# Patient Record
Sex: MALE | Race: BLACK OR AFRICAN AMERICAN | NOT HISPANIC OR LATINO | Employment: OTHER | URBAN - METROPOLITAN AREA
[De-identification: names, ages, dates, MRNs, and addresses within clinical notes are randomized per-mention and may not be internally consistent; named-entity substitution may affect disease eponyms.]

---

## 2017-07-13 ENCOUNTER — TELEPHONE (OUTPATIENT)
Dept: NEUROLOGY | Facility: CLINIC | Age: 37
End: 2017-07-13

## 2017-07-13 NOTE — TELEPHONE ENCOUNTER
Spoke to pt and scheduled new appt with Dr Davila 7/27 at 2pm/ pt required this particular day due to transportation. Notes in Epic.

## 2017-07-27 ENCOUNTER — OFFICE VISIT (OUTPATIENT)
Dept: NEUROLOGY | Facility: CLINIC | Age: 37
End: 2017-07-27
Payer: MEDICAID

## 2017-07-27 VITALS
SYSTOLIC BLOOD PRESSURE: 108 MMHG | HEIGHT: 72 IN | HEART RATE: 66 BPM | BODY MASS INDEX: 37.81 KG/M2 | DIASTOLIC BLOOD PRESSURE: 67 MMHG | WEIGHT: 279.13 LBS

## 2017-07-27 DIAGNOSIS — G40.209 COMPLEX PARTIAL SEIZURES EVOLVING TO GENERALIZED TONIC-CLONIC SEIZURES: Primary | ICD-10-CM

## 2017-07-27 PROCEDURE — 99205 OFFICE O/P NEW HI 60 MIN: CPT | Mod: S$PBB,,, | Performed by: PSYCHIATRY & NEUROLOGY

## 2017-07-27 PROCEDURE — 99999 PR PBB SHADOW E&M-EST. PATIENT-LVL III: CPT | Mod: PBBFAC,,, | Performed by: PSYCHIATRY & NEUROLOGY

## 2017-07-27 PROCEDURE — 99213 OFFICE O/P EST LOW 20 MIN: CPT | Mod: PBBFAC | Performed by: PSYCHIATRY & NEUROLOGY

## 2017-07-27 NOTE — LETTER
July 30, 2017      Justice Garcia MD  [address]  [address]  [facility]  [place] [id number]           [facility] [place] - Neurology Epilepsy  [address]  [facility] [address]  Phone: [phone]  Fax: [phone]          Patient: Young Joyner   MR Number: [id number]   YOB: 1980   Date of Visit: 7/27/2017       Dear Dr. Justice Garcia:    Thank you for referring Young Joyner to me for evaluation. Attached you will find relevant portions of my assessment and plan of care.    If you have questions, please do not hesitate to call me. I look forward to following Young Joyner along with you.    Sincerely,        Enclosure  CC:  No Recipients    If you would like to receive this communication electronically, please contact [email or web address] or [phone] to request more information on [employer] access.    For providers and/or their staff who would like to refer a patient to [facility], please contact us through our one-stop-shop provider referral line, [facility], at [phone].    If you feel you have received this communication in error or would no longer like to receive these types of communications, please e-mail [email or web address]
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
## 2017-07-31 PROBLEM — G40.209 COMPLEX PARTIAL SEIZURES EVOLVING TO GENERALIZED TONIC-CLONIC SEIZURES: Status: ACTIVE | Noted: 2017-07-31

## 2017-07-31 NOTE — ASSESSMENT & PLAN NOTE
38yo RH M with hx of seizures x 2002: poorly controlled  Risk factors: paternal family hx    Current AEDs:  - Levetiracetam 750mg bid  - PHT 400mg bid    Plan:  - will admit to EMU: for seizure characterization as well as to assess burden  - will d/c PHT at the time and optimize LEV +/- add another agent  - MRI Brain   - Seizure precautions/restrictions    Plan of care was discussed with patient and his wife in detail

## 2017-08-23 ENCOUNTER — TELEPHONE (OUTPATIENT)
Dept: NEUROLOGY | Facility: CLINIC | Age: 37
End: 2017-08-23

## 2017-08-23 NOTE — TELEPHONE ENCOUNTER
----- Message from Oz Benson sent at 8/23/2017  1:54 PM CDT -----  Contact: Patient @ [phone]  Patient is requesting a return call from Gabby about his arrival time on 8-25, [facility] call

## 2017-08-23 NOTE — TELEPHONE ENCOUNTER
Spoke to pt and advised EMU admission 8/25 is approved. Pt can check in btwn 11-12. Pt verbalized understanding and confirmed appt.

## 2017-08-25 ENCOUNTER — HOSPITAL ENCOUNTER (INPATIENT)
Facility: HOSPITAL | Age: 37
LOS: 2 days | Discharge: HOME | DRG: 101 | End: 2017-08-27
Attending: PSYCHIATRY & NEUROLOGY | Admitting: PSYCHIATRY & NEUROLOGY
Payer: MEDICAID

## 2017-08-25 DIAGNOSIS — G40.219 INTRACTABLE COMPLEX PARTIAL EPILEPSY: ICD-10-CM

## 2017-08-25 DIAGNOSIS — G40.209 COMPLEX PARTIAL SEIZURES EVOLVING TO GENERALIZED TONIC-CLONIC SEIZURES: ICD-10-CM

## 2017-08-25 LAB
ALBUMIN SERPL BCP-MCNC: 3.4 G/DL
ALP SERPL-CCNC: 101 U/L
ALT SERPL W/O P-5'-P-CCNC: 33 U/L
AMPHET+METHAMPHET UR QL: NEGATIVE
ANION GAP SERPL CALC-SCNC: 9 MMOL/L
AST SERPL-CCNC: 29 U/L
BARBITURATES UR QL SCN>200 NG/ML: NEGATIVE
BASOPHILS # BLD AUTO: 0.04 K/UL
BASOPHILS NFR BLD: 0.5 %
BENZODIAZ UR QL SCN>200 NG/ML: NEGATIVE
BILIRUB SERPL-MCNC: 0.2 MG/DL
BILIRUB UR QL STRIP: NEGATIVE
BUN SERPL-MCNC: 9 MG/DL
BZE UR QL SCN: NEGATIVE
CALCIUM SERPL-MCNC: 8.5 MG/DL
CANNABINOIDS UR QL SCN: NORMAL
CHLORIDE SERPL-SCNC: 107 MMOL/L
CLARITY UR REFRACT.AUTO: CLEAR
CO2 SERPL-SCNC: 25 MMOL/L
COLOR UR AUTO: YELLOW
CREAT SERPL-MCNC: 0.9 MG/DL
CREAT UR-MCNC: 262 MG/DL
DIFFERENTIAL METHOD: ABNORMAL
EOSINOPHIL # BLD AUTO: 0.1 K/UL
EOSINOPHIL NFR BLD: 1.6 %
ERYTHROCYTE [DISTWIDTH] IN BLOOD BY AUTOMATED COUNT: 14.4 %
EST. GFR  (AFRICAN AMERICAN): >60 ML/MIN/1.73 M^2
EST. GFR  (NON AFRICAN AMERICAN): >60 ML/MIN/1.73 M^2
GLUCOSE SERPL-MCNC: 91 MG/DL
GLUCOSE UR QL STRIP: NEGATIVE
HCT VFR BLD AUTO: 38.9 %
HGB BLD-MCNC: 13.4 G/DL
HGB UR QL STRIP: NEGATIVE
KETONES UR QL STRIP: NEGATIVE
LEUKOCYTE ESTERASE UR QL STRIP: NEGATIVE
LYMPHOCYTES # BLD AUTO: 4.1 K/UL
LYMPHOCYTES NFR BLD: 51.1 %
MCH RBC QN AUTO: 30.2 PG
MCHC RBC AUTO-ENTMCNC: 34.4 G/DL
MCV RBC AUTO: 88 FL
METHADONE UR QL SCN>300 NG/ML: NEGATIVE
MONOCYTES # BLD AUTO: 0.4 K/UL
MONOCYTES NFR BLD: 5.2 %
NEUTROPHILS # BLD AUTO: 3.4 K/UL
NEUTROPHILS NFR BLD: 41.5 %
NITRITE UR QL STRIP: NEGATIVE
OPIATES UR QL SCN: NEGATIVE
PCP UR QL SCN>25 NG/ML: NEGATIVE
PH UR STRIP: 5 [PH] (ref 5–8)
PLATELET # BLD AUTO: 209 K/UL
PMV BLD AUTO: 10 FL
POTASSIUM SERPL-SCNC: 3.6 MMOL/L
PROT SERPL-MCNC: 7.2 G/DL
PROT UR QL STRIP: NEGATIVE
RBC # BLD AUTO: 4.43 M/UL
SODIUM SERPL-SCNC: 141 MMOL/L
SP GR UR STRIP: 1.02 (ref 1–1.03)
TOXICOLOGY INFORMATION: NORMAL
URN SPEC COLLECT METH UR: NORMAL
UROBILINOGEN UR STRIP-ACNC: NEGATIVE EU/DL
WBC # BLD AUTO: 8.09 K/UL

## 2017-08-25 PROCEDURE — 80186 ASSAY OF PHENYTOIN FREE: CPT

## 2017-08-25 PROCEDURE — 36415 COLL VENOUS BLD VENIPUNCTURE: CPT

## 2017-08-25 PROCEDURE — 95951 PR EEG MONITORING/VIDEORECORD: CPT | Mod: 26,,, | Performed by: PSYCHIATRY & NEUROLOGY

## 2017-08-25 PROCEDURE — 20600001 HC STEP DOWN PRIVATE ROOM

## 2017-08-25 PROCEDURE — 80307 DRUG TEST PRSMV CHEM ANLYZR: CPT

## 2017-08-25 PROCEDURE — 99223 1ST HOSP IP/OBS HIGH 75: CPT | Mod: ,,, | Performed by: PSYCHIATRY & NEUROLOGY

## 2017-08-25 PROCEDURE — 95957 EEG DIGITAL ANALYSIS: CPT

## 2017-08-25 PROCEDURE — 81003 URINALYSIS AUTO W/O SCOPE: CPT

## 2017-08-25 PROCEDURE — 85025 COMPLETE CBC W/AUTO DIFF WBC: CPT

## 2017-08-25 PROCEDURE — 80177 DRUG SCRN QUAN LEVETIRACETAM: CPT

## 2017-08-25 PROCEDURE — 95951 HC EEG MONITORING/VIDEO RECORD: CPT

## 2017-08-25 PROCEDURE — 80053 COMPREHEN METABOLIC PANEL: CPT

## 2017-08-25 RX ORDER — TRAMADOL HYDROCHLORIDE 50 MG/1
100 TABLET ORAL ONCE
Status: COMPLETED | OUTPATIENT
Start: 2017-08-26 | End: 2017-08-26

## 2017-08-25 RX ORDER — ONDANSETRON 8 MG/1
8 TABLET, ORALLY DISINTEGRATING ORAL EVERY 8 HOURS PRN
Status: DISCONTINUED | OUTPATIENT
Start: 2017-08-25 | End: 2017-08-27 | Stop reason: HOSPADM

## 2017-08-25 RX ORDER — SODIUM CHLORIDE 0.9 % (FLUSH) 0.9 %
3 SYRINGE (ML) INJECTION EVERY 8 HOURS
Status: DISCONTINUED | OUTPATIENT
Start: 2017-08-25 | End: 2017-08-27 | Stop reason: HOSPADM

## 2017-08-25 RX ORDER — DIPHENHYDRAMINE HCL 50 MG
50 CAPSULE ORAL ONCE
Status: COMPLETED | OUTPATIENT
Start: 2017-08-26 | End: 2017-08-26

## 2017-08-25 RX ORDER — ACETAMINOPHEN 325 MG/1
650 TABLET ORAL EVERY 4 HOURS PRN
Status: DISCONTINUED | OUTPATIENT
Start: 2017-08-25 | End: 2017-08-27 | Stop reason: HOSPADM

## 2017-08-25 RX ORDER — DOCUSATE SODIUM 100 MG/1
100 CAPSULE, LIQUID FILLED ORAL 2 TIMES DAILY
Status: DISCONTINUED | OUTPATIENT
Start: 2017-08-25 | End: 2017-08-27 | Stop reason: HOSPADM
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
## 2017-08-25 NOTE — SUBJECTIVE & OBJECTIVE
No past medical history on file.    No past surgical history on file.    Review of patient's allergies indicates:  No Known Allergies    Current Neurological Medications: Keppra 750 mg BID and Dilantin 400 mg BID.     No current facility-administered medications on file prior to encounter.      No current outpatient prescriptions on file prior to encounter.     Family History     None        Social History Main Topics    Smoking status: Current Every Day Smoker    Smokeless tobacco: Not on file    Alcohol use Not on file    Drug use: Unknown    Sexual activity: Not on file     Review of Systems   Constitutional: Negative for activity change, appetite change, chills, diaphoresis, fatigue and fever.   HENT: Negative.    Eyes: Negative.    Respiratory: Negative.    Cardiovascular: Negative.    Gastrointestinal: Negative.    Endocrine: Negative.    Skin: Negative.    Neurological: Positive for seizures.   Psychiatric/Behavioral: Positive for confusion. The patient is nervous/anxious.      Objective:     Vital Signs (Most Recent):    Vital Signs (24h Range):  BP: ()/()   Arterial Line BP: ()/()         There is no height or weight on file to calculate BMI.    Physical Exam   Constitutional: He is oriented to person, place, and time. He appears well-developed and well-nourished.   HENT:   Head: Normocephalic and atraumatic.   Eyes: Conjunctivae and EOM are normal. Pupils are equal, round, and reactive to light.   Neck: Normal range of motion.   Cardiovascular: Normal heart sounds.    Pulmonary/Chest: Effort normal.   Abdominal: Soft.   Musculoskeletal: Normal range of motion.   Neurological: He is alert and oriented to person, place, and time. He has normal strength and normal reflexes. He has a normal Finger-Nose-Finger Test. Gait normal.   Reflex Scores:       Tricep reflexes are 2+ on the right side and 2+ on the left side.       Bicep reflexes are 2+ on the right side and 2+ on the left side.        Brachioradialis reflexes are 2+ on the right side and 2+ on the left side.       Patellar reflexes are 2+ on the right side and 2+ on the left side.       Achilles reflexes are 2+ on the right side and 2+ on the left side.  Psychiatric: His speech is normal.       NEUROLOGICAL EXAMINATION:     MENTAL STATUS   Oriented to person, place, and time.   Attention: normal. Concentration: normal.   Speech: speech is normal   Level of consciousness: alert  Knowledge: good.   Normal comprehension. Praxis: normal     CRANIAL NERVES   Cranial nerves II through XII intact.     CN III, IV, VI   Pupils are equal, round, and reactive to light.  Extraocular motions are normal.     MOTOR EXAM     Strength   Strength 5/5 throughout.     REFLEXES     Reflexes   Right brachioradialis: 2+  Left brachioradialis: 2+  Right biceps: 2+  Left biceps: 2+  Right triceps: 2+  Left triceps: 2+  Right patellar: 2+  Left patellar: 2+  Right achilles: 2+  Left achilles: 2+  Right : 2+  Left : 2+    SENSORY EXAM   Light touch normal.     GAIT AND COORDINATION     Gait  Gait: normal     Coordination   Finger to nose coordination: normal    Tremor   Resting tremor: absent      Significant Labs: All pertinent lab results from the past 24 hours have been reviewed.    Significant Imaging: I have reviewed all pertinent imaging results/findings within the past 24 hours.

## 2017-08-25 NOTE — ASSESSMENT & PLAN NOTE
Mr Joyner is a 37 year old AA man with a PMHx of sleep apnea and complex partial seizures who presents for EMU admission due to increased frequency of his seizure episodes. He will be admitted to EMU for seizure characterization and AED management.    -Continuous vEEG  -Hold all home AEDs. Pt reports that he takes Keppra 750 mg PO BID and Dilantin 400 mg PO BID.  -Seizure precautions per protocol  -Activating procedures: photic stimulation and hyperventilation twice daily; morning and afternoon.  -Sleep deprivation: Keep pt awake till 2 AM, and then wake pt up again at 6 AM.  -Tramadol 100 mg PO and Benadryl 50 mg PO once tomorrow AM.   -IV Valium PRN for GTC lasting greater than 5 minutes-please call EMU resident on call prior to administering the medication.  -Pt has been discussed with Dr. Kenney.

## 2017-08-26 PROBLEM — G40.219 INTRACTABLE COMPLEX PARTIAL EPILEPSY: Status: ACTIVE | Noted: 2017-08-26

## 2017-08-26 PROCEDURE — 94640 AIRWAY INHALATION TREATMENT: CPT

## 2017-08-26 PROCEDURE — 25000003 PHARM REV CODE 250: Performed by: STUDENT IN AN ORGANIZED HEALTH CARE EDUCATION/TRAINING PROGRAM

## 2017-08-26 PROCEDURE — 25000003 PHARM REV CODE 250: Performed by: PSYCHIATRY & NEUROLOGY

## 2017-08-26 PROCEDURE — 63600175 PHARM REV CODE 636 W HCPCS

## 2017-08-26 PROCEDURE — 63600175 PHARM REV CODE 636 W HCPCS: Performed by: PSYCHIATRY & NEUROLOGY

## 2017-08-26 PROCEDURE — 95813 EEG EXTND MNTR 61-119 MIN: CPT | Mod: 26,,, | Performed by: PSYCHIATRY & NEUROLOGY

## 2017-08-26 PROCEDURE — 95957 EEG DIGITAL ANALYSIS: CPT

## 2017-08-26 PROCEDURE — 25000242 PHARM REV CODE 250 ALT 637 W/ HCPCS: Performed by: NURSE PRACTITIONER

## 2017-08-26 PROCEDURE — A4216 STERILE WATER/SALINE, 10 ML: HCPCS | Performed by: STUDENT IN AN ORGANIZED HEALTH CARE EDUCATION/TRAINING PROGRAM

## 2017-08-26 PROCEDURE — 99233 SBSQ HOSP IP/OBS HIGH 50: CPT | Mod: ,,, | Performed by: PSYCHIATRY & NEUROLOGY

## 2017-08-26 PROCEDURE — 63600175 PHARM REV CODE 636 W HCPCS: Performed by: NURSE PRACTITIONER

## 2017-08-26 PROCEDURE — 20600001 HC STEP DOWN PRIVATE ROOM

## 2017-08-26 PROCEDURE — 95951 HC EEG MONITORING/VIDEO RECORD: CPT

## 2017-08-26 RX ORDER — LORAZEPAM 2 MG/ML
INJECTION INTRAMUSCULAR
Status: DISPENSED
Start: 2017-08-26 | End: 2017-08-27

## 2017-08-26 RX ORDER — LORAZEPAM 2 MG/ML
1 CONCENTRATE ORAL EVERY 8 HOURS PRN
Status: DISCONTINUED | OUTPATIENT
Start: 2017-08-26 | End: 2017-08-26

## 2017-08-26 RX ORDER — LORAZEPAM 1 MG/1
1 TABLET ORAL 3 TIMES DAILY
Status: DISCONTINUED | OUTPATIENT
Start: 2017-08-26 | End: 2017-08-27 | Stop reason: HOSPADM

## 2017-08-26 RX ORDER — LORAZEPAM 2 MG/ML
1 INJECTION INTRAMUSCULAR ONCE
Status: COMPLETED | OUTPATIENT
Start: 2017-08-26 | End: 2017-08-26

## 2017-08-26 RX ORDER — IPRATROPIUM BROMIDE AND ALBUTEROL SULFATE 2.5; .5 MG/3ML; MG/3ML
3 SOLUTION RESPIRATORY (INHALATION) ONCE
Status: COMPLETED | OUTPATIENT
Start: 2017-08-26 | End: 2017-08-26

## 2017-08-26 RX ORDER — ONDANSETRON 2 MG/ML
8 INJECTION INTRAMUSCULAR; INTRAVENOUS ONCE
Status: COMPLETED | OUTPATIENT
Start: 2017-08-26 | End: 2017-08-26

## 2017-08-26 RX ORDER — LAMOTRIGINE 25 MG/1
25 TABLET ORAL DAILY
Status: DISCONTINUED | OUTPATIENT
Start: 2017-08-26 | End: 2017-08-27 | Stop reason: HOSPADM

## 2017-08-26 RX ADMIN — IPRATROPIUM BROMIDE AND ALBUTEROL SULFATE 3 ML: .5; 3 SOLUTION RESPIRATORY (INHALATION) at 03:08

## 2017-08-26 RX ADMIN — ONDANSETRON 8 MG: 2 INJECTION INTRAMUSCULAR; INTRAVENOUS at 01:08

## 2017-08-26 RX ADMIN — ONDANSETRON 8 MG: 8 TABLET, ORALLY DISINTEGRATING ORAL at 01:08

## 2017-08-26 RX ADMIN — DIPHENHYDRAMINE HYDROCHLORIDE 50 MG: 50 CAPSULE ORAL at 06:08

## 2017-08-26 RX ADMIN — LORAZEPAM 1 MG: 2 INJECTION, SOLUTION INTRAMUSCULAR; INTRAVENOUS at 02:08

## 2017-08-26 RX ADMIN — DOCUSATE SODIUM 100 MG: 100 CAPSULE, LIQUID FILLED ORAL at 09:08

## 2017-08-26 RX ADMIN — TRAMADOL HYDROCHLORIDE 100 MG: 50 TABLET, COATED ORAL at 06:08

## 2017-08-26 RX ADMIN — LEVETIRACETAM 750 MG: 250 TABLET, FILM COATED ORAL at 09:08

## 2017-08-26 RX ADMIN — Medication 3 ML: at 10:08

## 2017-08-26 RX ADMIN — LORAZEPAM 1 MG: 1 TABLET ORAL at 09:08

## 2017-08-26 NOTE — PROGRESS NOTES
Nurse called to room at 0050 d/t pt having seizure. Upon arrival, pt with observed tonic clonic seizure with tongue biting lasting about 3 minutes. Pt desat to 88 percent. O2 applied. Pt presents with post ictal confusion, N/V, and impulsiveness lasting about 60 minutes. Pt observed to be thrashing around in the bed and constantly trying to get up. Pt throwing up bright red bloody sputum. One time dose of IV zofran ordered and given. Pt finally calm and at baseline at 0200. VSS. Will continue to monitor pt.

## 2017-08-26 NOTE — PROGRESS NOTES
Pt having tonic clonic seizure that lasted approximately 3 minutes, followed by a one hour long post ictal phase. During the post ictal phase, he became combative, became nauseated, and attempted to get OOB several times while being confused and unable to state name or orientation otherwise. After approximately 30 minutes into the post ictal phase, he stated his name and stated that he did not recall having a seizure. Side rails up and padded. O2 sat remained 98% on 6L NC O2 throughout seizure and post ictal phase. Wife remains at bedside.
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
## 2017-08-26 NOTE — PROGRESS NOTES
Paged stroke team in regards to pt with O2 sat of 88 percent and continuously throwing up bright red bloody sputum. Orders for a chest xray placed. Will continue to monitor.

## 2017-08-26 NOTE — PLAN OF CARE
Problem: Patient Care Overview  Goal: Plan of Care Review  Outcome: Ongoing (interventions implemented as appropriate)  POC reviewed with the patient. Verbalized understanding. Fall precautions and safety maintained. No falls/injury this shift. No new skin impairments noted. AAOx4. Afebrile. VSS. Cardiac monitoring continued. Sleep deprived until 2 am. Seizure precautions maintained. Seizure activity overnight, see previous note. Pt progressing toward goals. Will continue to monitor.

## 2017-08-26 NOTE — PROGRESS NOTES
Pt had tonic clonic seizure that lasted approximately 3 minutes, followed by a 45-50 minute post ictal phase. Pt bit his tongue and became very combative during the post ictal phase. He climbed OOB and onto the floor despite the best efforts of three staff members. Pt could not be redirected or follow commands. He was flailing his arms and legs. Ativan 1 mg IVP was given, to which patient had no response. Dr. Davila was paged and she ordered an additional dose of Ativan 1 mg IVP. This was given with moderate to good results noted. Pt returned to bed and is presently sleeping. His O2 Sat = 98, and remained 98% throughout the seizure and post ictal phase and the administration of the Ativan.

## 2017-08-27 ENCOUNTER — NURSE TRIAGE (OUTPATIENT)
Dept: ADMINISTRATIVE | Facility: CLINIC | Age: 37
End: 2017-08-27

## 2017-08-27 VITALS
RESPIRATION RATE: 16 BRPM | TEMPERATURE: 98 F | WEIGHT: 289 LBS | DIASTOLIC BLOOD PRESSURE: 63 MMHG | SYSTOLIC BLOOD PRESSURE: 118 MMHG | HEART RATE: 81 BPM | BODY MASS INDEX: 37.09 KG/M2 | OXYGEN SATURATION: 98 % | HEIGHT: 74 IN

## 2017-08-27 PROCEDURE — 99233 SBSQ HOSP IP/OBS HIGH 50: CPT | Mod: ,,, | Performed by: PSYCHIATRY & NEUROLOGY

## 2017-08-27 PROCEDURE — 25000003 PHARM REV CODE 250: Performed by: PSYCHIATRY & NEUROLOGY

## 2017-08-27 PROCEDURE — 25000003 PHARM REV CODE 250: Performed by: STUDENT IN AN ORGANIZED HEALTH CARE EDUCATION/TRAINING PROGRAM

## 2017-08-27 RX ORDER — LORAZEPAM 1 MG/1
TABLET ORAL
Qty: 18 TABLET | Refills: 0 | Status: SHIPPED | OUTPATIENT
Start: 2017-08-27 | End: 2018-07-26

## 2017-08-27 RX ORDER — LAMOTRIGINE 25 MG/1
TABLET ORAL
Qty: 120 TABLET | Refills: 3 | Status: SHIPPED | OUTPATIENT
Start: 2017-08-27 | End: 2018-01-09 | Stop reason: SDUPTHER

## 2017-08-27 RX ORDER — LEVETIRACETAM 1000 MG/1
1000 TABLET ORAL 2 TIMES DAILY
Qty: 180 TABLET | Refills: 1 | Status: SHIPPED | OUTPATIENT
Start: 2017-08-27 | End: 2018-07-26 | Stop reason: SDUPTHER

## 2017-08-27 RX ADMIN — LEVETIRACETAM 750 MG: 250 TABLET, FILM COATED ORAL at 10:08

## 2017-08-27 RX ADMIN — LORAZEPAM 1 MG: 1 TABLET ORAL at 06:08

## 2017-08-27 RX ADMIN — ACETAMINOPHEN 650 MG: 325 TABLET ORAL at 04:08

## 2017-08-27 RX ADMIN — LAMOTRIGINE 25 MG: 25 TABLET ORAL at 10:08
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
## 2017-08-27 NOTE — PROCEDURES
EMU Monitoring  Date/Time: 8/26/2017 8:24 PM  Performed by: GELY MORRIS  Authorized by: CRISTAL LING          EPILEPSY MONITORING UNIT  EEG/VIDEO TELEMETRY REPORT  DATE OF SERVICE: 8/25/17-8/27/17  EEG NUMBER: -1, 2, 3, 4 (File 4 unreadable as patient removed electrodes)  REQUESTED BY:  Lola  LOCATION OF SERVICE: [facility]    METHODOLOGY      Electroencephalographic (EEG) is recorded with electrodes placed according to the International 10-20 placement system.  Thirty Two (32) channels of digital signal including the T1 and T2 electrodes are simultaneously recorded from the scalp and also including EKG, EMG  and/or eye movement monitors.  Recording band pass was 0.1 to 512 hz.  Digital video recording of the patient is simultaneously recorded with the EEG.  The patient is instructed report clinical symptoms which may occur during the recording session.  EEG and video recording is stored and archived in digital format. Activation procedures which include photic stimulation, hyperventilation and instructing patients to perform simple task are done in selected patients.         The EEG is displayed on a monitor screen and can be reformatted into different montages for evaluation.  The entire recoding is submitted for computer assisted analysis to detect spike and electrographic seizure activity.  The entire recording is visually reviewed and the times identified by computer analysis as being spikes or seizures are reviewed again.  Compresses spectral analysis (CSA) is also performed on the activity recorded from each individual channel.  This is displayed as a power display of frequencies from 0 to 30 Hz over time.   The CSA analysis is done and displayed continuously.  This is reviewed for asymmetries in power between homologous areas of the scalp and for presence of changes in power which canbe seen when seizures occur.  Sections of suspected abnormalities on the CSA is then compared with the original EEG  recording.      [employer] software was also utilized in the review of this study.  This software suite analyzes the EEG recording in multiple domains.  Coherence and rhythmicity is computed to identify EEG sections which may contain organized seizures.  Each channel undergoes analysis to detect presence of spike and sharp waves which have special and morphological characteristic of epileptic activity.  The routine EEG recording is converted from spacial into frequency domain.  This is then displayed comparing homologous areas to identify areas of significant asymmetry.  Algorithm to identify non-cortically generated artifact is used to separate eye movement, EMG and other artifact from the EEG.      Recording Times  Start on 8/25/17 at 15:25:04  Stop on  8/26/17 at 18:14:51 (patient ripped electrodes off while postictally confused)  A total of 27 hours of EEG/Video telemetry was recorded.    ELECTROENCEPHALOGRAM  INTERICTAL: The record shows a good  organization at rest, consisting of a 20-50 uV 9 Hz posterior dominant rhythm with good  reactivity. There is mild bilateral beta activity.    Drowsiness is characterized by attenuation of the background, vertex waves, and bilateral theta slowing. Stage II sleep is characterized by slowing, vertex waves, and symmetric sleep spindles. Slow wave and REM sleep are recorded.    EKG recording shows a sinus rhythm.    ICTAL:  Seizures Recorded  Seizure 1 - On 8/26/17 at 00:49:00 there  Is onset of rhythmic 4-5 Hz activity over the left temporal region maximal at T1 followed by high amplitude, sharply contoured 3-4 Hz rhythmic activity with development of bilateral rhythmic 3-4 Hz spike and wave activity at 00:49:40 before the record is largely obscured by myogenicartifact at 00:49:57 until end of the seizure at 00:51:23  Seizure 2 - On 8/26/17 at 13:51:20 there is onset of rhythmic 4-5 Hz activity over the left temporal region maximal at T1 that rapidly becomes sharply contoured  and increases in amplitude before spreading throughout the left hemisphere and then bilaterally at 13:52:17. The record is obscured by myogenic artifact at 13:52:19 until end of the seizure at 13:53:46.  Seizure 3 - On 8/26/17 at 18:11:13 there is onset of rhythmic, sharply contoured 3-4 Hz activity over the left frontotemporal region maximal at T1 as well as F7, T3, and T5. At 18:11:27, this evolves into 1.5-2 Hz spike and slow wave discharges with spread throughout the left hemisphere followed by diffuse background slowing at 18:11:49.     CLINICAL DESCRIPTION OF EVENTS/SEIZURES:  Seizure 1: At 00:49:00, the patient is lying in bed when he exhibits orofacial automatisms at 00:49:09 followed by head turn to the right at 00:49:49 with figure 4 posturing at 00:50:16 with the left arm flexed and right arm extended. Tonic clonic jerking begins at 00:50:29 until the end of the seizure.  Seizure 2- At 13:51:20 on 8/26/17b, the patient is lying in bed on his left side and exhibits orofacial automatisms at 13:51:31 with a head turn and eye deviation to the right at 13:52:14. He exhibits continued chewing automatisms before vocalizing an ictal cry. His left arm tonically postures followed shortly thereafter by the right. At 13:42:29, he hold both arms flexed upward before exhibiting R>L sided tonic clonic jerking ending at 13:53:46.  Seizure 3-  At 18:11:13 on 8/26/17, the patient is lying in bed. He appears disoriented and sits up at 18:11:59 before pulling off his bedsheets and flailing his arms. He begins to pull at wires in his bed at 18:12:32 before getting out of bed at 18:14:28 and ripping off his electrodes. The study is unreadable after 18:14:51 with scattered left hemispheric sharp wave discharges until the end of the recording     FINAL SUMMARY  ELECTROENCEPHALOGRAM: This study is abnormal and diagnostic for a left temporal focal onset seizure disorder. Three typical patient seizures, all with left temporal onset  are captured during the course of the recording.    CLINICAL SEIZURE/EVENT   Classification: Focal onset   Localization: Temporal   Lateralization: Left    Oz Kenney MD  Department of Neurology  [facility]

## 2017-08-27 NOTE — DISCHARGE SUMMARY
[facility]  Discharge Summary      Admit Date: 8/25/2017    Discharge Date and Time:  08/27/2017 9:14 AM    Attending Physician: Oz Kenney MD     Reason for Admission: Seizure eval    Procedures Performed: * No surgery found *    Hospital Course: Patient admitted to EMU for event characterization. All home AEDs held. Multiple typical seizures captured during admission with L hemispheric onset. Home dilantin discontinued. Patient discharged on increased dose of keppra (1000 mg BID up from 750 mg BID) with initiation of Lamictal and ativan taper. Patient will follow up within 1 month with Dr. Davila.     Consults: none    Significant Diagnostic Studies: cEEG: 3 electroclinical seizures captured with L hemispheric onset    Final Diagnoses:    Principal Problem: Complex partial seizures evolving to generalized tonic-clonic seizures    Discharged Condition: good    Disposition: Home or Self Care    Follow Up/Patient Instructions:     Medications:  Reconciled Home Medications:   Current Discharge Medication List      START taking these medications    Details   lamotrigine (LAMICTAL) 25 MG tablet 1 tab daily for 2 weeks, then 2 tabs daily for 2 weeks, then 4 tabs daily for 2 weeks and continue  Qty: 120 tablet, Refills: 3      levetiracetam (KEPPRA) 1000 MG tablet Take 1 tablet (1,000 mg total) by mouth 2 (two) times daily.  Qty: 180 tablet, Refills: 1      lorazepam (ATIVAN) 1 MG tablet 1 tab 3 times daily for 3 days, then 1 tab 2 times daily for 3 days, then 1 tab daily for 3 days then stop  Qty: 18 tablet, Refills: 0             Discharge Procedure Orders  Diet general     Activity as tolerated     Call MD for:  increased confusion or weakness     Call MD for:  persistent dizziness, light-headedness, or visual disturbances       Follow-up Information     Noemi Davila MD In 1 month.    Specialty:  Neurology  Contact information:  [address] [place] [address] [id number] [phone]                  Oz Kenney MD  Department of Neurology  [facility]

## 2017-08-27 NOTE — TELEPHONE ENCOUNTER
Family had questions about patient's discharge medications that I was unable to answer. She wanted to know if patient received his lamicital this morning already. Advised to attempt to reach out to floor where he was discharged.    Reason for Disposition   Information only question and nurse able to answer    Protocols used: ST NO PROTOCOL CALL: INFORMATION ONLY-A-[place]

## 2017-08-28 LAB
PHENYTOIN FREE SERPL-MCNC: 1.1 MCG/ML
PHENYTOIN, TOTAL: 11.2 MCG/ML

## 2017-09-06 LAB — LEVETIRACETAM SERPL-MCNC: <1 UG/ML (ref 3–60)

## 2017-09-08 ENCOUNTER — TELEPHONE (OUTPATIENT)
Dept: NEUROLOGY | Facility: CLINIC | Age: 37
End: 2017-09-08

## 2017-09-27 ENCOUNTER — TELEPHONE (OUTPATIENT)
Dept: NEUROLOGY | Facility: CLINIC | Age: 37
End: 2017-09-27

## 2017-09-27 NOTE — TELEPHONE ENCOUNTER
Called patient and he informed me that the fax number he gave me yesterday was incorrect. A new # was given an back to work form was re faxed off today.

## 2017-09-27 NOTE — TELEPHONE ENCOUNTER
"----- Message from Laron Myers sent at 9/27/2017  9:12 AM CDT -----  Contact: Self @ [phone]   Pt said he gave wrong fax number in last message about faxing a "return to work" letter to his work. Correct fax is .  "

## 2017-09-28 ENCOUNTER — OFFICE VISIT (OUTPATIENT)
Dept: NEUROLOGY | Facility: CLINIC | Age: 37
End: 2017-09-28
Payer: MEDICAID

## 2017-09-28 VITALS
SYSTOLIC BLOOD PRESSURE: 116 MMHG | WEIGHT: 273.56 LBS | HEIGHT: 74 IN | BODY MASS INDEX: 35.11 KG/M2 | HEART RATE: 67 BPM | DIASTOLIC BLOOD PRESSURE: 71 MMHG

## 2017-09-28 DIAGNOSIS — G40.209 COMPLEX PARTIAL SEIZURES EVOLVING TO GENERALIZED TONIC-CLONIC SEIZURES: Primary | ICD-10-CM

## 2017-09-28 DIAGNOSIS — G40.219 PARTIAL SYMPTOMATIC EPILEPSY WITH COMPLEX PARTIAL SEIZURES, INTRACTABLE, WITHOUT STATUS EPILEPTICUS: Primary | ICD-10-CM

## 2017-09-28 DIAGNOSIS — Z51.81 ENCOUNTER FOR MEDICATION TITRATION: ICD-10-CM

## 2017-09-28 PROCEDURE — 99212 OFFICE O/P EST SF 10 MIN: CPT | Mod: PBBFAC | Performed by: PSYCHIATRY & NEUROLOGY

## 2017-09-28 PROCEDURE — 99215 OFFICE O/P EST HI 40 MIN: CPT | Mod: S$PBB,,, | Performed by: PSYCHIATRY & NEUROLOGY

## 2017-09-28 PROCEDURE — 99999 PR PBB SHADOW E&M-EST. PATIENT-LVL II: CPT | Mod: PBBFAC,,, | Performed by: PSYCHIATRY & NEUROLOGY

## 2017-09-28 PROCEDURE — 3008F BODY MASS INDEX DOCD: CPT | Mod: ,,, | Performed by: PSYCHIATRY & NEUROLOGY

## 2017-09-28 RX ORDER — PHENYTOIN SODIUM 100 MG/1
CAPSULE, EXTENDED RELEASE ORAL
Refills: 5 | COMMUNITY
Start: 2017-07-24 | End: 2018-07-26

## 2017-09-28 RX ORDER — HYDROCODONE BITARTRATE AND ACETAMINOPHEN 10; 325 MG/1; MG/1
1 TABLET ORAL 3 TIMES DAILY PRN
Refills: 0 | COMMUNITY
Start: 2017-09-21 | End: 2018-07-26

## 2017-09-28 RX ORDER — ALPRAZOLAM 0.5 MG/1
0.5 TABLET ORAL DAILY PRN
Refills: 0 | COMMUNITY
Start: 2017-09-21 | End: 2018-07-26

## 2017-09-28 RX ORDER — LAMOTRIGINE 100 MG/1
100 TABLET ORAL 2 TIMES DAILY
Qty: 60 TABLET | Refills: 3 | Status: SHIPPED | OUTPATIENT
Start: 2017-09-28 | End: 2018-07-26 | Stop reason: SDUPTHER

## 2017-09-28 RX ORDER — ORPHENADRINE CITRATE 100 MG/1
100 TABLET, EXTENDED RELEASE ORAL NIGHTLY PRN
Refills: 0 | COMMUNITY
Start: 2017-08-21 | End: 2018-07-26

## 2017-09-28 RX ORDER — CLONAZEPAM 1 MG/1
TABLET ORAL
Refills: 2 | COMMUNITY
Start: 2017-08-30 | End: 2018-07-26

## 2017-09-28 RX ORDER — CLONAZEPAM 0.5 MG/1
0.5 TABLET ORAL 2 TIMES DAILY
Refills: 1 | Status: ON HOLD | COMMUNITY
Start: 2017-07-31 | End: 2023-02-13 | Stop reason: HOSPADM

## 2017-09-28 RX ORDER — FLUVOXAMINE MALEATE 100 MG/1
100 TABLET, COATED ORAL DAILY
Refills: 1 | COMMUNITY
Start: 2017-07-24 | End: 2018-07-26

## 2017-09-28 NOTE — LETTER
September 28, 2017      Talon Narvaez MD  [address] [address]           [facility] - Neurology Epilepsy  [address] [facility], 7th [address]  [facility] [address]  Phone: [phone]  Fax: [phone]          Patient: Young Joyner   MR Number: [id number]   YOB: 1980   Date of Visit: 9/28/2017       Dear Dr. Talon Narvaez:    Thank you for referring Young Joyner to me for evaluation. Attached you will find relevant portions of my assessment and plan of care.    If you have questions, please do not hesitate to call me. I look forward to following Young Joyner along with you.    Sincerely,    Noemi Davila MD    Enclosure  CC:  No Recipients    If you would like to receive this communication electronically, please contact [email or web address] or [phone] to request more information on [employer] access.    For providers and/or their staff who would like to refer a patient to [facility], please contact us through our one-stop-shop provider referral line, [facility] [place], at [phone].    If you feel you have received this communication in error or would no longer like to receive these types of communications, please e-mail [email or web address]

## 2017-09-28 NOTE — ASSESSMENT & PLAN NOTE
36yo M with hx of seizures x 2002: staring spells and aura followed by GTC seizures.  EMU evaluation (8/17) multiple typical seizures of left hemispheric onset captured   - no seizures reported since EMU admission, but occasional episodes suggestive of aura persists    Currently on AEDs: Levetiracetam 1000mg bid and Lamotrigine 100mg bid    Plan:  - increase LTG by 50mg weekly to 150mg bid (written instructions given) and check levels 2 weeks after - can adjust based on symptoms and side-effects if any  - Continue LEV at same doses; check levels     Discussed plan of care in detail with patient and his wife.
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
## 2017-12-21 RX ORDER — LAMOTRIGINE 25 MG/1
TABLET ORAL
Qty: 120 TABLET | Refills: 3 | OUTPATIENT
Start: 2017-12-21

## 2018-01-02 ENCOUNTER — TELEPHONE (OUTPATIENT)
Dept: NEUROLOGY | Facility: CLINIC | Age: 38
End: 2018-01-02

## 2018-01-02 NOTE — TELEPHONE ENCOUNTER
"Received call from pt stating he is "doing well for the most part" since EMU discharge in 8/27/17 however pt sleeps with cpap machine and has recently been waking up without cpap, bitten tongue and extremely fatigued afterward. Pt states wife works night shift therefore he is alone (children in house) for no one to observe his sleeping. Pt is fearful to fall back asleep therefore stays up and eventually falls asleep during day. Advised pt to have levels drawn as soon as he can. Pt continues Lamictal 150mg BID and Keppra 1000mg BID. Pt requests a letter for night sitter for seizure observation and safety.     Pt will have levels drawn and f/u with Dr Davila 1/24/18.  "

## 2018-01-09 DIAGNOSIS — G40.209 COMPLEX PARTIAL SEIZURES EVOLVING TO GENERALIZED TONIC-CLONIC SEIZURES: ICD-10-CM

## 2018-01-09 RX ORDER — LAMOTRIGINE 100 MG/1
100 TABLET ORAL 2 TIMES DAILY
Qty: 60 TABLET | Refills: 3 | Status: CANCELLED | OUTPATIENT
Start: 2018-01-09

## 2018-01-10 RX ORDER — LAMOTRIGINE 100 MG/1
100 TABLET ORAL DAILY
Qty: 90 TABLET | Refills: 3 | Status: SHIPPED | OUTPATIENT
Start: 2018-01-10 | End: 2018-07-26

## 2018-01-12 ENCOUNTER — TELEPHONE (OUTPATIENT)
Dept: NEUROLOGY | Facility: CLINIC | Age: 38
End: 2018-01-12

## 2018-01-12 NOTE — TELEPHONE ENCOUNTER
----- Message from Oz Benson sent at 1/12/2018  9:19 AM CST -----  Contact: Patient @ [phone]  Patient is returning a missed call from Gabby about the medication (lamoTRIgine (LAMICTAL) 100 MG tablet  )  pls return call

## 2018-01-12 NOTE — TELEPHONE ENCOUNTER
Spoke to Laisisi, pts wife, and advised I will call in Lamictal 150mg tablet twice daily. Jesus verbalized understanding.

## 2018-01-24 ENCOUNTER — OFFICE VISIT (OUTPATIENT)
Dept: NEUROLOGY | Facility: CLINIC | Age: 38
End: 2018-01-24
Payer: MEDICAID

## 2018-01-24 VITALS
HEART RATE: 71 BPM | HEIGHT: 74 IN | WEIGHT: 284.38 LBS | BODY MASS INDEX: 36.5 KG/M2 | SYSTOLIC BLOOD PRESSURE: 122 MMHG | DIASTOLIC BLOOD PRESSURE: 85 MMHG

## 2018-01-24 DIAGNOSIS — G40.209 COMPLEX PARTIAL SEIZURES EVOLVING TO GENERALIZED TONIC-CLONIC SEIZURES: Primary | ICD-10-CM

## 2018-01-24 PROCEDURE — 99999 PR PBB SHADOW E&M-EST. PATIENT-LVL II: CPT | Mod: PBBFAC,,, | Performed by: PSYCHIATRY & NEUROLOGY

## 2018-01-24 PROCEDURE — 99212 OFFICE O/P EST SF 10 MIN: CPT | Mod: PBBFAC | Performed by: PSYCHIATRY & NEUROLOGY

## 2018-01-24 PROCEDURE — 99215 OFFICE O/P EST HI 40 MIN: CPT | Mod: S$PBB,,, | Performed by: PSYCHIATRY & NEUROLOGY
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
## 2018-01-24 NOTE — ASSESSMENT & PLAN NOTE
37yo M with hx of seizures x 2002: staring spells and aura followed by GTC sz  - no sz since last visit; doing well    Continue current doses of AEDs:  1) LTG 150mg bid  2) LEV 1000mg bid  - check AED levels    Return in 6 months or earlier prn

## 2018-02-12 ENCOUNTER — TELEPHONE (OUTPATIENT)
Dept: NEUROLOGY | Facility: CLINIC | Age: 38
End: 2018-02-12

## 2018-02-12 NOTE — TELEPHONE ENCOUNTER
Spoke to pt who requested letter for dentist stating need for implants instead of dentures due to seizure activity. Pt stated he wore a  to bed and almost choked while having a seizure. Pt does not want to have anything in mouth that can become loose/dislodged. Will discuss with [facility] for needed paperwork.

## 2018-02-12 NOTE — TELEPHONE ENCOUNTER
----- Message from Linda Abraham sent at 2/12/2018  9:01 AM CST -----  Contact: patient @ [phone]  Calling to speak with someone regarding the seizure he had yesterday, and he completed his blood work at [facility]. Patient wants to let someone that he thinks that this was because of his mouth. Please call.

## 2018-02-24 RX ORDER — LEVETIRACETAM 1000 MG/1
TABLET ORAL
Qty: 180 TABLET | Refills: 1 | OUTPATIENT
Start: 2018-02-24

## 2018-02-28 ENCOUNTER — TELEPHONE (OUTPATIENT)
Dept: NEUROLOGY | Facility: CLINIC | Age: 38
End: 2018-02-28

## 2018-02-28 NOTE — TELEPHONE ENCOUNTER
Pt was actually calling for pain medication for a toothache. He then said he was having seizures because of the pain. I asked him if he could go to the lab to get his blood drawn and he said he just had blood work done at [facility]. Spoke to Zeus at [place] who is faxing the results.

## 2018-02-28 NOTE — TELEPHONE ENCOUNTER
Per Dr Davila since pt had a seizure this morning she want his Lamictal level checked again. It was 4.5 on 2-10-18, and increase his Lamictal to 150qam and 175mg qpm for one week then increase again to 150mg qam and 200mg qpm. When I spoke to the patient he said he doesn't need to increase his medicine, he needs something for pain so that he can sleep and he will stop having seizures.

## 2018-03-06 ENCOUNTER — TELEPHONE (OUTPATIENT)
Dept: NEUROLOGY | Facility: CLINIC | Age: 38
End: 2018-03-06

## 2018-03-08 ENCOUNTER — TELEPHONE (OUTPATIENT)
Dept: NEUROLOGY | Facility: CLINIC | Age: 38
End: 2018-03-08
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
## 2018-03-12 ENCOUNTER — TELEPHONE (OUTPATIENT)
Dept: NEUROLOGY | Facility: CLINIC | Age: 38
End: 2018-03-12

## 2018-03-12 NOTE — TELEPHONE ENCOUNTER
Pt was not calling for labs. He was actually calling for pain medicine for a toothache. When the patient was told that Dr Davila was not going to give him pain meds he then said that another doctor would be calling us to find out if he can take pain meds.

## 2018-03-12 NOTE — TELEPHONE ENCOUNTER
----- Message from Linda Abraham sent at 3/12/2018  2:10 PM CDT -----  Contact: pt @ [phone]  Calling to speak with someone in Dr. Davila's office regarding his blood work. Please call.

## 2018-03-26 ENCOUNTER — TELEPHONE (OUTPATIENT)
Dept: NEUROLOGY | Facility: CLINIC | Age: 38
End: 2018-03-26

## 2018-03-26 NOTE — TELEPHONE ENCOUNTER
I called the pharmacy and they acknowledged that keppra was reordered on 3/6/18, and they were probably getting an old  order mixed in. I also called and left a message w/pt that he could pick this up today

## 2018-04-03 ENCOUNTER — TELEPHONE (OUTPATIENT)
Dept: NEUROLOGY | Facility: CLINIC | Age: 38
End: 2018-04-03

## 2018-04-03 NOTE — TELEPHONE ENCOUNTER
----- Message from Linda Abraham sent at 4/3/2018  9:33 AM CDT -----  Contact: pt @ [phone]  Calling to get the status of the paperwork for his sleep apnea machine and home health. Please call.

## 2018-04-11 ENCOUNTER — TELEPHONE (OUTPATIENT)
Dept: NEUROLOGY | Facility: CLINIC | Age: 38
End: 2018-04-11

## 2018-04-11 NOTE — TELEPHONE ENCOUNTER
----- Message from Ashley Nicolas sent at 4/11/2018 10:44 AM CDT -----  Contact: self @ [phone]  Calling to speak with someone concerning his long term care and paper work.  Pls call.       I returned his call. We didn't receive the faxed papers from him for forms that need to be completed. He will send again to us with my name on them.

## 2018-04-13 ENCOUNTER — TELEPHONE (OUTPATIENT)
Dept: NEUROLOGY | Facility: CLINIC | Age: 38
End: 2018-04-13

## 2018-04-13 NOTE — TELEPHONE ENCOUNTER
I left a message for pt that we faxed over the papers requesting a night sitter to the state dept. I also sent a copy to the patient by mail

## 2018-04-18 ENCOUNTER — TELEPHONE (OUTPATIENT)
Dept: NEUROLOGY | Facility: CLINIC | Age: 38
End: 2018-04-18

## 2018-04-18 NOTE — TELEPHONE ENCOUNTER
I returned his call and assured him that the paperwork was sent back for the completed form for his need for night sitter. A copy was also mailed to him

## 2018-05-04 ENCOUNTER — TELEPHONE (OUTPATIENT)
Dept: NEUROLOGY | Facility: CLINIC | Age: 38
End: 2018-05-04

## 2018-05-04 NOTE — TELEPHONE ENCOUNTER
----- Message from Laron Myers sent at 5/4/2018  2:01 PM CDT -----  Contact: Self @ [phone]  Pt is asking to speak w/ the nurse, stating that he's been waking up in his back yard 3x in the last 2 months. Pt said he does not remember how he gets there. Pls call.

## 2018-05-31 ENCOUNTER — TELEPHONE (OUTPATIENT)
Dept: NEUROLOGY | Facility: CLINIC | Age: 38
End: 2018-05-31

## 2018-05-31 NOTE — TELEPHONE ENCOUNTER
----- Message from Oz Benson sent at 5/31/2018 12:02 PM CDT -----  Contact: Patient @ [phone]  Patient is calling to schedule the 6mn f/u, pls call

## 2018-06-11 ENCOUNTER — TELEPHONE (OUTPATIENT)
Dept: NEUROLOGY | Facility: CLINIC | Age: 38
End: 2018-06-11

## 2018-06-11 NOTE — TELEPHONE ENCOUNTER
I returned his call. He needs appt in July. He will bring papers to be completed for his assisstance with care

## 2018-06-15 ENCOUNTER — TELEPHONE (OUTPATIENT)
Dept: NEUROLOGY | Facility: CLINIC | Age: 38
End: 2018-06-15

## 2018-06-15 NOTE — TELEPHONE ENCOUNTER
I returned pt's call. He will fax over papers to be completed. I also gave him number to [facility], as he lives 4 hours away and wants to stay over after his July appt. W/Dr. Davila

## 2018-06-19 ENCOUNTER — TELEPHONE (OUTPATIENT)
Dept: NEUROLOGY | Facility: CLINIC | Age: 38
End: 2018-06-19

## 2018-06-29 ENCOUNTER — TELEPHONE (OUTPATIENT)
Dept: NEUROLOGY | Facility: CLINIC | Age: 38
End: 2018-06-29

## 2018-06-29 NOTE — TELEPHONE ENCOUNTER
I returned his call and gave him my fax number for his forms to be completed. We also rescheduled his appt per pt request
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
## 2018-07-26 ENCOUNTER — OFFICE VISIT (OUTPATIENT)
Dept: NEUROLOGY | Facility: CLINIC | Age: 38
End: 2018-07-26
Payer: MEDICAID

## 2018-07-26 VITALS
DIASTOLIC BLOOD PRESSURE: 68 MMHG | HEIGHT: 74 IN | BODY MASS INDEX: 35.94 KG/M2 | HEART RATE: 65 BPM | WEIGHT: 280 LBS | SYSTOLIC BLOOD PRESSURE: 106 MMHG

## 2018-07-26 DIAGNOSIS — G40.209 COMPLEX PARTIAL SEIZURES EVOLVING TO GENERALIZED TONIC-CLONIC SEIZURES: Primary | ICD-10-CM

## 2018-07-26 PROCEDURE — 99999 PR PBB SHADOW E&M-EST. PATIENT-LVL II: CPT | Mod: PBBFAC,,, | Performed by: PSYCHIATRY & NEUROLOGY

## 2018-07-26 PROCEDURE — 99215 OFFICE O/P EST HI 40 MIN: CPT | Mod: S$PBB,,, | Performed by: PSYCHIATRY & NEUROLOGY

## 2018-07-26 PROCEDURE — 99212 OFFICE O/P EST SF 10 MIN: CPT | Mod: PBBFAC | Performed by: PSYCHIATRY & NEUROLOGY

## 2018-07-26 RX ORDER — LEVETIRACETAM 1000 MG/1
1000 TABLET ORAL 2 TIMES DAILY
Qty: 60 TABLET | Refills: 6 | Status: SHIPPED | OUTPATIENT
Start: 2018-07-26 | End: 2019-03-28 | Stop reason: SDUPTHER

## 2018-07-26 RX ORDER — LAMOTRIGINE 100 MG/1
150 TABLET ORAL 2 TIMES DAILY
Qty: 60 TABLET | Refills: 6 | Status: SHIPPED | OUTPATIENT
Start: 2018-07-26 | End: 2019-03-28 | Stop reason: DRUGHIGH

## 2018-07-31 ENCOUNTER — TELEPHONE (OUTPATIENT)
Dept: NEUROLOGY | Facility: CLINIC | Age: 38
End: 2018-07-31
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
## 2018-08-01 ENCOUNTER — TELEPHONE (OUTPATIENT)
Dept: NEUROLOGY | Facility: CLINIC | Age: 38
End: 2018-08-01

## 2018-08-01 NOTE — TELEPHONE ENCOUNTER
Called and left message for Patient informing him that his letter has been written and we will send it in the mail for him.

## 2018-08-01 NOTE — TELEPHONE ENCOUNTER
----- Message from Ashley Nicolas sent at 8/1/2018 11:10 AM CDT -----  Contact: self @ [phone]  Pt says he is returning Meenakshi's call.  Pls call.

## 2018-08-04 ENCOUNTER — NURSE TRIAGE (OUTPATIENT)
Dept: ADMINISTRATIVE | Facility: CLINIC | Age: 38
End: 2018-08-04

## 2018-08-04 NOTE — TELEPHONE ENCOUNTER
Patient called to report the following:     -hard for me to use CPAP machine   -mouth dry and painful   -I fell and knocked out some of my teeth   -dentist was suppose to contact me   -I need something for pain   -denies fever     Education completed per [facility] On Call Care Advice including follow up with provider within 4 hours and when to call back. Patient verbalized understating.         Reason for Disposition   [1] SEVERE mouth pain (e.g., excruciating) AND [2] not improved after 2 hours of pain medicine    Protocols used: ST MOUTH PAIN-A-AH

## 2018-08-06 ENCOUNTER — TELEPHONE (OUTPATIENT)
Dept: NEUROLOGY | Facility: CLINIC | Age: 38
End: 2018-08-06

## 2018-08-06 NOTE — TELEPHONE ENCOUNTER
----- Message from Ashley Nicolas sent at 8/6/2018 12:55 PM CDT -----  Contact: self @ [phone]  Pt says he needs to ask Dr Davila something.  He says it is very important but he did not want to give any details.  He says his mouth still hurts.  Pls call.

## 2018-08-06 NOTE — TELEPHONE ENCOUNTER
I left a message that I spoke w/Dr. Davila and she advised him to go to an urgent care about his head pain and swelling. She would not be the one to treat this type of injury that may be related to his recent tooth problems.
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
## 2018-10-01 ENCOUNTER — TELEPHONE (OUTPATIENT)
Dept: NEUROLOGY | Facility: CLINIC | Age: 38
End: 2018-10-01

## 2018-10-08 ENCOUNTER — TELEPHONE (OUTPATIENT)
Dept: NEUROLOGY | Facility: CLINIC | Age: 38
End: 2018-10-08

## 2018-10-08 NOTE — TELEPHONE ENCOUNTER
----- Message from Lesley Treviño MA sent at 10/8/2018  4:23 PM CDT -----  Contact: self @ [phone]   I believe he's returning your call  ----- Message -----  From: Ashley Nicolas  Sent: 10/8/2018   4:03 PM  To: Lola Franklin Staff    Pt says he is returning your call from last week.  Would also like to discuss something.  Pls call today if possible.  Pt says he [place] in a bad area and you may need to call him twice.

## 2018-11-07 ENCOUNTER — TELEPHONE (OUTPATIENT)
Dept: NEUROLOGY | Facility: CLINIC | Age: 38
End: 2018-11-07

## 2018-11-07 NOTE — TELEPHONE ENCOUNTER
----- Message from Lesley Treviño MA sent at 11/7/2018 10:52 AM CST -----  Contact: self @ [phone]      ----- Message -----  From: Ashley Nicolas  Sent: 11/7/2018   9:44 AM  To: Lola Franklin Staff    Pt would like to speak with someone concerning part of his medication.  Pls call.         I returned pt's call about his mouth pain. He would like us to make a statement that we knew of his pain, but the neurologist was unable to help him with this problem. I told him I would talk with whoever can assist him with this matter

## 2018-12-30 NOTE — TELEPHONE ENCOUNTER
----- Message from Ashley Nicolas sent at 2/28/2018 10:01 AM CST -----  Contact: self @ [phone]  Pt is calling to speak with someone concerning his seizures.  Pt says he has been taking his seizure medication, antibiotics and ibuprofen.  Pt says he feels like he is not getting enough of his seizure medication.  Pls call to discuss asap.    No

## 2019-01-07 ENCOUNTER — TELEPHONE (OUTPATIENT)
Dept: NEUROLOGY | Facility: CLINIC | Age: 39
End: 2019-01-07

## 2019-01-07 NOTE — TELEPHONE ENCOUNTER
----- Message from Oz Benson sent at 1/7/2019 11:37 AM CST -----  Contact: Patient @ [phone]  Patient requesting a return call about a f/u appt, pls call

## 2019-01-19 RX ORDER — LAMOTRIGINE 150 MG/1
TABLET ORAL
Qty: 60 TABLET | Refills: 11 | OUTPATIENT
Start: 2019-01-19

## 2019-03-08 ENCOUNTER — TELEPHONE (OUTPATIENT)
Dept: NEUROLOGY | Facility: CLINIC | Age: 39
End: 2019-03-08

## 2019-03-08 PROBLEM — F32.A DEPRESSIVE DISORDER: Status: RESOLVED | Noted: 2019-03-08 | Resolved: 2019-03-08

## 2019-03-08 PROBLEM — F32.A DEPRESSIVE DISORDER: Status: ACTIVE | Noted: 2019-03-08

## 2019-03-08 PROBLEM — R56.9 SEIZURES: Status: ACTIVE | Noted: 2019-03-08

## 2019-03-08 PROBLEM — F12.10 CANNABIS ABUSE: Status: ACTIVE | Noted: 2019-03-08

## 2019-03-08 PROBLEM — G47.00 INSOMNIA: Status: ACTIVE | Noted: 2018-04-07

## 2019-03-08 PROBLEM — G40.219 INTRACTABLE COMPLEX PARTIAL EPILEPSY: Status: RESOLVED | Noted: 2017-08-26 | Resolved: 2019-03-08

## 2019-03-08 PROBLEM — G47.33 OBSTRUCTIVE SLEEP APNEA SYNDROME: Status: ACTIVE | Noted: 2019-03-08

## 2019-03-08 NOTE — TELEPHONE ENCOUNTER
Hospital medicine has been in contact with hospital that pt is currently at and they are working together to get pt transported her

## 2019-03-08 NOTE — TELEPHONE ENCOUNTER
I spoke w/Cade at [facility] about transferring pt to us-per Dr. Davila.     Pt is in the ER for the 2nd time. He was there very early AM for a seizure and was D/C'd home. Wife got him back in after 2nd seizure that happened about 4 hours later.    He had a 3rd seizure while in the hospital that was about 2 hours after the 2nd seizure. They deny missing any doses of medications and there is no sign of infection or abnormal labs so far. Keppra level was drawn but will take time to result.    Angélica w/[facility] med admitting was notified of need for transfer.

## 2019-03-08 NOTE — TELEPHONE ENCOUNTER
----- Message from Linda Abraham sent at 3/8/2019  3:29 PM CST -----  Contact: pt @ [phone] or   Calling to speak with Dr. Davila regarding him getting transferred to [facility] main campus, says that where he is they can not do anything for him. Please call.

## 2019-03-11 ENCOUNTER — TELEPHONE (OUTPATIENT)
Dept: NEUROLOGY | Facility: CLINIC | Age: 39
End: 2019-03-11

## 2019-03-11 NOTE — TELEPHONE ENCOUNTER
----- Message from Laron Myers sent at 3/11/2019  9:03 AM CDT -----  Patient Requesting Sooner Appointment.     Reason for sooner appt.: pt experienced 3 seizures recently  When is the first available appointment? None available for the year  Communication Preference: Mrs. Joyner (wife) @ [phone]  Additional Information:

## 2019-03-18 ENCOUNTER — TELEPHONE (OUTPATIENT)
Dept: NEUROLOGY | Facility: CLINIC | Age: 39
End: 2019-03-18

## 2019-03-18 NOTE — TELEPHONE ENCOUNTER
Called in refills for Keppra and Lamictal so [facility]. x0 refills. Pt will see Dr Davila in a couple weeks

## 2019-03-18 NOTE — TELEPHONE ENCOUNTER
----- Message from Oz Benson sent at 3/18/2019 11:41 AM CDT -----  Contact: Jesus ( spouse ) @ [phone]  Rx Refill/Request     Is this a Refill or New Rx: yes    Rx Name and Strength:  (levETIRAcetam (KEPPRA) 1000 MG tablet ) (lamoTRIgine (LAMICTAL) 150 MG tablet )     Preferred Pharmacy with phone number:     [facility] [address] [address] AT [facility] OF [facility] [address] [address]  Phone: [phone] Fax: [phone]
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
## 2019-03-28 ENCOUNTER — OFFICE VISIT (OUTPATIENT)
Dept: NEUROLOGY | Facility: CLINIC | Age: 39
End: 2019-03-28
Payer: MEDICAID

## 2019-03-28 VITALS — DIASTOLIC BLOOD PRESSURE: 64 MMHG | SYSTOLIC BLOOD PRESSURE: 102 MMHG | HEART RATE: 63 BPM

## 2019-03-28 DIAGNOSIS — G40.209 COMPLEX PARTIAL SEIZURES EVOLVING TO GENERALIZED TONIC-CLONIC SEIZURES: Primary | ICD-10-CM

## 2019-03-28 PROCEDURE — 99999 PR PBB SHADOW E&M-EST. PATIENT-LVL I: ICD-10-PCS | Mod: PBBFAC,,, | Performed by: PSYCHIATRY & NEUROLOGY

## 2019-03-28 PROCEDURE — 99999 PR PBB SHADOW E&M-EST. PATIENT-LVL I: CPT | Mod: PBBFAC,,, | Performed by: PSYCHIATRY & NEUROLOGY

## 2019-03-28 PROCEDURE — 99215 PR OFFICE/OUTPT VISIT, EST, LEVL V, 40-54 MIN: ICD-10-PCS | Mod: S$PBB,,, | Performed by: PSYCHIATRY & NEUROLOGY

## 2019-03-28 PROCEDURE — 99215 OFFICE O/P EST HI 40 MIN: CPT | Mod: S$PBB,,, | Performed by: PSYCHIATRY & NEUROLOGY

## 2019-03-28 PROCEDURE — 99211 OFF/OP EST MAY X REQ PHY/QHP: CPT | Mod: PBBFAC | Performed by: PSYCHIATRY & NEUROLOGY

## 2019-03-28 RX ORDER — LEVETIRACETAM 1000 MG/1
1000 TABLET ORAL 2 TIMES DAILY
Qty: 60 TABLET | Refills: 11 | Status: SHIPPED | OUTPATIENT
Start: 2019-03-28 | End: 2020-03-11

## 2019-03-28 RX ORDER — LAMOTRIGINE 150 MG/1
150 TABLET ORAL 2 TIMES DAILY
Qty: 60 TABLET | Refills: 11 | Status: SHIPPED | OUTPATIENT
Start: 2019-03-28 | End: 2020-03-27

## 2019-03-31 NOTE — ASSESSMENT & PLAN NOTE
38 yo M with hx of seizures x 2002: staring spells and aura followed by GTC sz  - was seizure-free > 1 year  - had a sz on 3/8/19: due to incorrect dose of LTG  - no sz subsequently      Continue current doses of AEDs: advised re compliance and correct doses  1) LTG 150mg bid  2) LEV 1000mg bid  - check AED levels     Seizure log  Seizure precautions/restrictions    Plan of care was discussed in detail with patient and his wife.

## 2019-07-18 ENCOUNTER — TELEPHONE (OUTPATIENT)
Dept: NEUROLOGY | Facility: CLINIC | Age: 39
End: 2019-07-18

## 2019-07-18 NOTE — TELEPHONE ENCOUNTER
----- Message from Laron Myers sent at 7/18/2019  8:21 AM CDT -----  Needs Advice    Reason for call: Jesus is calling to schedule hospital f/u        Communication Preference: Jesus [place] @ [phone]    Additional Information:

## 2019-08-13 ENCOUNTER — TELEPHONE (OUTPATIENT)
Dept: NEUROLOGY | Facility: CLINIC | Age: 39
End: 2019-08-13

## 2019-08-13 NOTE — TELEPHONE ENCOUNTER
----- Message from Linda Abraham sent at 8/13/2019 10:30 AM CDT -----  Contact: pt @ [phone]  Calling to schedule an appt with Dr. Davila, no available appts in the system. Please call.

## 2019-08-15 ENCOUNTER — OFFICE VISIT (OUTPATIENT)
Dept: NEUROLOGY | Facility: CLINIC | Age: 39
End: 2019-08-15
Payer: MEDICAID

## 2019-08-15 VITALS
HEIGHT: 74 IN | WEIGHT: 280.63 LBS | DIASTOLIC BLOOD PRESSURE: 64 MMHG | HEART RATE: 66 BPM | BODY MASS INDEX: 36.01 KG/M2 | SYSTOLIC BLOOD PRESSURE: 107 MMHG

## 2019-08-15 DIAGNOSIS — G40.209 COMPLEX PARTIAL SEIZURES EVOLVING TO GENERALIZED TONIC-CLONIC SEIZURES: Primary | ICD-10-CM

## 2019-08-15 PROCEDURE — 99215 PR OFFICE/OUTPT VISIT, EST, LEVL V, 40-54 MIN: ICD-10-PCS | Mod: S$PBB,,, | Performed by: PSYCHIATRY & NEUROLOGY

## 2019-08-15 PROCEDURE — 99999 PR PBB SHADOW E&M-EST. PATIENT-LVL II: ICD-10-PCS | Mod: PBBFAC,,, | Performed by: PSYCHIATRY & NEUROLOGY

## 2019-08-15 PROCEDURE — 99212 OFFICE O/P EST SF 10 MIN: CPT | Mod: PBBFAC | Performed by: PSYCHIATRY & NEUROLOGY

## 2019-08-15 PROCEDURE — 99215 OFFICE O/P EST HI 40 MIN: CPT | Mod: S$PBB,,, | Performed by: PSYCHIATRY & NEUROLOGY

## 2019-08-15 PROCEDURE — 99999 PR PBB SHADOW E&M-EST. PATIENT-LVL II: CPT | Mod: PBBFAC,,, | Performed by: PSYCHIATRY & NEUROLOGY
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
## 2019-08-27 NOTE — ASSESSMENT & PLAN NOTE
38 yo M with hx of seizures x 2002: staring spells and aura followed by GTC sz  - was seizure-free > 1 year  - had a sz on 3/8/19: due to incorrect dose of LTG  - recent (7/19) admission after x 2 GTC sz - triggered by significant stressors  - compliant with AED doses    Plan:   Continue current doses of AEDs: advised re compliance and correct doses  1) LTG 150mg bid  2) LEV 1000mg bid  -  AED levels: p     - if levels are low or any additional sz, will need further dose increase (-200 --> 200mg bid and/or LEV 0396-1163 --> 1500mg bid)     Seizure log  Seizure precautions/restrictions    Plan of care was discussed in detail with patient and his wife.

## 2019-09-13 ENCOUNTER — TELEPHONE (OUTPATIENT)
Dept: NEUROLOGY | Facility: CLINIC | Age: 39
End: 2019-09-13

## 2019-09-13 NOTE — TELEPHONE ENCOUNTER
----- Message from Yessica Potts sent at 9/13/2019 10:18 AM CDT -----  No. [phone]  Patient's nephew, Diego Abraham, has been with patient in case he would have a seizure.  He needs a doctor's note for [place] for 9/11, 9/12, and 9/13.     Fax no. [phone]     Please call today.

## 2019-09-16 ENCOUNTER — TELEPHONE (OUTPATIENT)
Dept: NEUROLOGY | Facility: CLINIC | Age: 39
End: 2019-09-16

## 2019-09-16 NOTE — TELEPHONE ENCOUNTER
----- Message from Salena Morales sent at 9/14/2019 12:48 PM CDT -----  Contact: self  Type:  Patient states need to speak with nurse.   Patient states was not feeling well went to ER.    Patient states need medication level checked.      Name of Caller:Patient   When is the first available appointment  Symptoms:  Best Call Back Number:[phone]  Additional Information:

## 2019-10-01 ENCOUNTER — TELEPHONE (OUTPATIENT)
Dept: NEUROLOGY | Facility: CLINIC | Age: 39
End: 2019-10-01

## 2019-10-01 NOTE — TELEPHONE ENCOUNTER
I returned pt's call. He is requesting another order to have a daytime sitter when his wife is gone at work. We will complete any forms and have them sent in.         ----- Message from Lesley Treviño MA sent at 10/1/2019  1:49 PM CDT -----  Contact: pt  PLEASE!!!!  ----- Message -----  From: Chanelle Ritter  Sent: 10/1/2019   1:31 PM CDT  To: Lola Franklin Staff    Pt  Called requesting a call back from nurse    Can be reached at [phone]

## 2019-10-01 NOTE — TELEPHONE ENCOUNTER
I attempted to contact long term care access services for reinstatement of a daytime sitter. Both lines would not allow a voice mail. I faxed over a request to the [place] [employer]

## 2019-10-02 ENCOUNTER — TELEPHONE (OUTPATIENT)
Dept: NEUROLOGY | Facility: CLINIC | Age: 39
End: 2019-10-02

## 2019-10-02 NOTE — TELEPHONE ENCOUNTER
I returned pts call and left a message to call us back. Letter was faxed to his nephew's school and also to the long term care-access services to contact us, as I was unable to speak with anyone or to leave a voicemail at their number        ----- Message from Heath Knight sent at 10/2/2019  8:25 AM CDT -----  Contact: Pt   Pt missed a call and would like the nurse to return their call. Pt has other health concerns as well. Pt lives in small area so you may have to call twice.     Pt can be reached at [phone].

## 2020-02-18 ENCOUNTER — TELEPHONE (OUTPATIENT)
Dept: NEUROLOGY | Facility: CLINIC | Age: 40
End: 2020-02-18

## 2020-02-18 NOTE — TELEPHONE ENCOUNTER
I returned pt's call and left a message that he will be placed on the list and scheduled when the April appts are released. I also said to call us back for any further needs or concerns

## 2020-03-07 DIAGNOSIS — G40.209 COMPLEX PARTIAL SEIZURES EVOLVING TO GENERALIZED TONIC-CLONIC SEIZURES: ICD-10-CM

## 2020-03-11 RX ORDER — LAMOTRIGINE 100 MG/1
TABLET ORAL
Qty: 90 TABLET | Refills: 3 | Status: SHIPPED | OUTPATIENT
Start: 2020-03-11 | End: 2020-04-09 | Stop reason: ALTCHOICE

## 2020-03-11 RX ORDER — LEVETIRACETAM 1000 MG/1
TABLET ORAL
Qty: 60 TABLET | Refills: 11 | Status: SHIPPED | OUTPATIENT
Start: 2020-03-11 | End: 2020-07-27 | Stop reason: SDUPTHER

## 2020-04-09 NOTE — TELEPHONE ENCOUNTER
Request was sent to Dr. Davila to change medication strength to one tablet of 150mg take twice daily    ----- Message from Ashley Nicolas sent at 4/9/2020 11:11 AM CDT -----  Contact: self 2 [phone]  Pt is calling to req a new prescription for lamoTRIgine (LAMICTAL) 150 MG tablet.  Pt says Dr Davila does not want him to break the 100 mg tablets anymore.  Pt says he would like to speak with someone concerning his medications.  Pls call.     [facility][id number] [address]  AT [facility] [address] [phone] (Phone)      [phone] (Fax)

## 2020-04-10 RX ORDER — LAMOTRIGINE 150 MG/1
150 TABLET ORAL 2 TIMES DAILY
Qty: 60 TABLET | Refills: 4 | Status: SHIPPED | OUTPATIENT
Start: 2020-04-10 | End: 2020-09-09

## 2020-06-09 ENCOUNTER — TELEPHONE (OUTPATIENT)
Dept: NEUROLOGY | Facility: CLINIC | Age: 40
End: 2020-06-09

## 2020-06-09 NOTE — TELEPHONE ENCOUNTER
Patient has had a couple episodes over the last few months. Advised wife to please send the lab results to me and we can possibly adjust his medications and get him scheduled for July

## 2020-06-09 NOTE — TELEPHONE ENCOUNTER
----- Message from Ashley Nicolas sent at 6/9/2020  8:32 AM CDT -----  Contact: Jesus Vegas (wife) @ [phone]  Pts wife says pt had a seizure in February, 2 in May and then again this morning.  She says they are currently at a local ED.  She says she requested they draw labs.  Pts wife says she is concerned and would like to speak with Dr Davila.  Pls call.

## 2020-06-15 DIAGNOSIS — G47.00 INSOMNIA, UNSPECIFIED TYPE: Primary | ICD-10-CM

## 2020-06-15 RX ORDER — TRAZODONE HYDROCHLORIDE 50 MG/1
50 TABLET ORAL NIGHTLY
Qty: 30 TABLET | Refills: 1 | Status: SHIPPED | OUTPATIENT
Start: 2020-06-15 | End: 2020-08-10

## 2020-06-27 ENCOUNTER — TELEPHONE (OUTPATIENT)
Dept: INTERVENTIONAL RADIOLOGY/VASCULAR | Facility: HOSPITAL | Age: 40
End: 2020-06-27

## 2020-06-27 NOTE — PLAN OF CARE
Received call from Pt's wife regarding GTC seizure lasting for 2 mins @!4 am. She took the patient to [facility] ER following the seizure.     While in the hospital he had another seizure lasting 10 mins. He was given Ativan per wife and has been drowsy since. He is answering questions.     Recommended to continue care at the hospital with Neurology on staff.     We will schedule a virtual visit with Dr Davila or one of the epileptologist.

## 2020-06-29 NOTE — TELEPHONE ENCOUNTER
Spoke to pts wife. She will set up the [facility] portal so that we can schedule a virtual visit. He has stopped using his CPAP machine which his wife thinks may be the reason for the recent seizures

## 2020-07-27 DIAGNOSIS — G40.209 COMPLEX PARTIAL SEIZURES EVOLVING TO GENERALIZED TONIC-CLONIC SEIZURES: ICD-10-CM

## 2020-07-27 RX ORDER — LEVETIRACETAM 1000 MG/1
1000 TABLET ORAL 2 TIMES DAILY
Qty: 60 TABLET | Refills: 2 | Status: SHIPPED | OUTPATIENT
Start: 2020-07-27 | End: 2020-07-28 | Stop reason: SDUPTHER

## 2020-07-27 NOTE — TELEPHONE ENCOUNTER
----- Message from Stas Kessler sent at 7/27/2020 10:23 AM CDT -----  Contact: pt  Please call pt at [phone] after calling the pharmacy    Refill request for levETIRAcetam (KEPPRA) 1000 MG tablet    [id number] [address] AT [facility] [address] [phone] (Phone)  [phone] (Fax)    Patient is out of medication    Also requesting a sooner appt     Thank you

## 2020-07-28 DIAGNOSIS — G40.209 COMPLEX PARTIAL SEIZURES EVOLVING TO GENERALIZED TONIC-CLONIC SEIZURES: ICD-10-CM

## 2020-07-28 RX ORDER — LEVETIRACETAM 1000 MG/1
1500 TABLET ORAL 2 TIMES DAILY
Qty: 90 TABLET | Refills: 2 | Status: SHIPPED | OUTPATIENT
Start: 2020-07-28 | End: 2020-12-23

## 2020-07-28 NOTE — TELEPHONE ENCOUNTER
----- Message from Laron Myers sent at 7/28/2020 10:27 AM CDT -----  Contact: pt @ [phone]  Pt states KEPPRA script sent to pharmacy yesterday is incorrect, pt states it needs to be for 3000 MG daily, pt states the ED went up on medication. Pt states he's out of the medication.
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
## 2020-08-18 ENCOUNTER — OFFICE VISIT (OUTPATIENT)
Dept: NEUROLOGY | Facility: CLINIC | Age: 40
End: 2020-08-18
Payer: MEDICAID

## 2020-08-18 DIAGNOSIS — G40.209 COMPLEX PARTIAL SEIZURES EVOLVING TO GENERALIZED TONIC-CLONIC SEIZURES: Primary | ICD-10-CM

## 2020-08-18 DIAGNOSIS — G47.33 OBSTRUCTIVE SLEEP APNEA SYNDROME: ICD-10-CM

## 2020-08-18 PROCEDURE — 99214 PR OFFICE/OUTPT VISIT, EST, LEVL IV, 30-39 MIN: ICD-10-PCS | Mod: 95,,, | Performed by: PSYCHIATRY & NEUROLOGY

## 2020-08-18 PROCEDURE — 99214 OFFICE O/P EST MOD 30 MIN: CPT | Mod: 95,,, | Performed by: PSYCHIATRY & NEUROLOGY

## 2020-08-19 ENCOUNTER — TELEPHONE (OUTPATIENT)
Dept: NEUROLOGY | Facility: CLINIC | Age: 40
End: 2020-08-19

## 2020-08-19 NOTE — TELEPHONE ENCOUNTER
Spoke to insurance company as requested by patient for his dental procedure. The insurance company had no idea what I was talking about

## 2020-08-19 NOTE — TELEPHONE ENCOUNTER
----- Message from Jenny Riddle sent at 8/19/2020  3:06 PM CDT -----  Regarding: pt  Contact: pt  Pt called needs to speak to someone concerning yesterday visit    Please advise     Pt can be reached at [phone]

## 2020-08-28 ENCOUNTER — TELEPHONE (OUTPATIENT)
Dept: NEUROLOGY | Facility: CLINIC | Age: 40
End: 2020-08-28

## 2020-08-28 NOTE — ASSESSMENT & PLAN NOTE
38 yo M with hx of seizures x 2002: staring spells and aura followed by GTC sz  - was seizure-free > 1 year until 3/2019  - last sz was in 6/2020 - possibly related to poor CPAP use     Plan:   Continue current doses of AEDs: advised re compliance and correct doses  1) LTG 150mg bid  2) LEV 1500mg bid  -  AED levels: p     - if levels are low or any additional sz, can increase dose further (-200 --> 200mg bid)     - requesting letter for dentist: teeth removal with implants planned     Seizure log  Seizure precautions/restrictions    Plan of care was discussed in detail with patient and his wife.
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
## 2020-09-08 ENCOUNTER — TELEPHONE (OUTPATIENT)
Dept: NEUROLOGY | Facility: CLINIC | Age: 40
End: 2020-09-08

## 2020-09-08 NOTE — TELEPHONE ENCOUNTER
----- Message from Jenny Riddle sent at 9/8/2020  8:36 AM CDT -----  Regarding: pt [place] de [place]  Contact: pt  Pt would like to speak with the nurse as soon as possible     Pt can be reached at [phone] pt states you might have to call twice

## 2020-09-09 ENCOUNTER — TELEPHONE (OUTPATIENT)
Dept: NEUROLOGY | Facility: CLINIC | Age: 40
End: 2020-09-09

## 2020-09-09 NOTE — TELEPHONE ENCOUNTER
----- Message from Chanelle Ritter sent at 9/9/2020  1:29 PM CDT -----  Pt missed a call and would the nurse to return their call.    Pt can be reached at [phone]

## 2020-09-09 NOTE — TELEPHONE ENCOUNTER
Spoke with patient, I believe he was looking for clearance for dental surgery. I contacted the dentist office regarding clearance at [phone] but there was no answer and no option to leave a message

## 2020-10-16 ENCOUNTER — TELEPHONE (OUTPATIENT)
Dept: NEUROLOGY | Facility: CLINIC | Age: 40
End: 2020-10-16

## 2020-10-16 NOTE — TELEPHONE ENCOUNTER
----- Message from Stas Ambrose sent at 10/16/2020  1:24 PM CDT -----  Regarding: appt access  Pt would like to speak with a nurse regarding next available appt. Please give pt a call back at [phone].

## 2020-11-10 ENCOUNTER — TELEPHONE (OUTPATIENT)
Dept: NEUROLOGY | Facility: CLINIC | Age: 40
End: 2020-11-10

## 2020-11-10 NOTE — TELEPHONE ENCOUNTER
"Spoke with patient, who is very upset because he is still having trouble sleeping and tooth pain. Advised the patient that Dr Davila will only address his seizures and that he should call the dentist regarding the tooth pain and the sleep specialist for his sleep issues. He asked to see Dr Davila and I explained that I had no availability but that I would add him to the waitlist for an appointment. He then got very agitated and accused me of giving him the "run around". I assured him that I added him to the waitlist and would call him with a cancellation, at which point he hung up on me   "

## 2020-11-10 NOTE — TELEPHONE ENCOUNTER
----- Message from Cyrus Smiley sent at 11/10/2020 10:01 AM CST -----  Regarding: call back      The Pt is having a lot of pain and is also tired and can't do anything.  The Pt would like an appt.    Phone # [phone]

## 2021-01-15 ENCOUNTER — TELEPHONE (OUTPATIENT)
Dept: NEUROLOGY | Facility: CLINIC | Age: 41
End: 2021-01-15

## 2021-02-10 ENCOUNTER — TELEPHONE (OUTPATIENT)
Dept: NEUROLOGY | Facility: CLINIC | Age: 41
End: 2021-02-10

## 2021-03-15 ENCOUNTER — TELEPHONE (OUTPATIENT)
Dept: NEUROLOGY | Facility: CLINIC | Age: 41
End: 2021-03-15

## 2021-03-17 ENCOUNTER — TELEPHONE (OUTPATIENT)
Dept: NEUROLOGY | Facility: CLINIC | Age: 41
End: 2021-03-17

## 2021-04-27 ENCOUNTER — OFFICE VISIT (OUTPATIENT)
Dept: NEUROLOGY | Facility: CLINIC | Age: 41
End: 2021-04-27
Payer: MEDICAID

## 2021-04-27 DIAGNOSIS — G40.209 COMPLEX PARTIAL SEIZURES EVOLVING TO GENERALIZED TONIC-CLONIC SEIZURES: Primary | ICD-10-CM

## 2021-04-27 PROCEDURE — 99214 OFFICE O/P EST MOD 30 MIN: CPT | Mod: 95,,, | Performed by: PSYCHIATRY & NEUROLOGY

## 2021-04-27 PROCEDURE — 99214 PR OFFICE/OUTPT VISIT, EST, LEVL IV, 30-39 MIN: ICD-10-PCS | Mod: 95,,, | Performed by: PSYCHIATRY & NEUROLOGY

## 2021-07-31 NOTE — TELEPHONE ENCOUNTER
----- Message from Rafia Estes sent at 3/8/2019  3:58 PM CST -----  Contact: Roberta Er Nurse [place]   Needs Advice    Reason for call: pt is calling to speak with the nurse pt is in the hospital for seizures and pt is at a hospital in [facility] and  wants to know why its taking them so long to transfer him to [facility] pt would like for someone to call him about what is going on they are calling about the future of bed availability pt is getting frustrated they are waiting on a bed and no one can tell them a time or anything         Communication Preference: can you please call the Er Nurse Roberta Rn [phone]    Additional Information: none    ALEKSANDRA     
See tel call from Lesley Treviño approx. 15 mins ago. Patient was requesting an update on his expected transfer from a facility in [place], [place]. This is not the responsibility of the clinic staff nor clinic provider. I advised patient access rep that this responsibility belongs to that of the [facility] and/r house supv who may be helping to coordinate the transfer.  
n/a

## 2021-12-07 ENCOUNTER — LAB VISIT (OUTPATIENT)
Dept: LAB | Facility: HOSPITAL | Age: 41
End: 2021-12-07
Payer: MEDICAID

## 2021-12-07 ENCOUNTER — OFFICE VISIT (OUTPATIENT)
Dept: NEUROLOGY | Facility: CLINIC | Age: 41
End: 2021-12-07
Payer: MEDICAID

## 2021-12-07 VITALS
HEIGHT: 74 IN | BODY MASS INDEX: 36.92 KG/M2 | DIASTOLIC BLOOD PRESSURE: 73 MMHG | SYSTOLIC BLOOD PRESSURE: 118 MMHG | HEART RATE: 67 BPM | WEIGHT: 287.69 LBS

## 2021-12-07 DIAGNOSIS — R56.9 SEIZURES: ICD-10-CM

## 2021-12-07 DIAGNOSIS — R56.9 SEIZURES: Primary | ICD-10-CM

## 2021-12-07 PROCEDURE — 80177 DRUG SCRN QUAN LEVETIRACETAM: CPT

## 2021-12-07 PROCEDURE — 99999 PR PBB SHADOW E&M-EST. PATIENT-LVL II: ICD-10-PCS | Mod: PBBFAC,,, | Performed by: PSYCHIATRY & NEUROLOGY

## 2021-12-07 PROCEDURE — 36415 COLL VENOUS BLD VENIPUNCTURE: CPT

## 2021-12-07 PROCEDURE — 99214 OFFICE O/P EST MOD 30 MIN: CPT | Mod: S$PBB,,, | Performed by: PSYCHIATRY & NEUROLOGY

## 2021-12-07 PROCEDURE — 80175 DRUG SCREEN QUAN LAMOTRIGINE: CPT

## 2021-12-07 PROCEDURE — 99999 PR PBB SHADOW E&M-EST. PATIENT-LVL II: CPT | Mod: PBBFAC,,, | Performed by: PSYCHIATRY & NEUROLOGY

## 2021-12-07 PROCEDURE — 99212 OFFICE O/P EST SF 10 MIN: CPT | Mod: PBBFAC | Performed by: PSYCHIATRY & NEUROLOGY

## 2021-12-07 PROCEDURE — 99214 PR OFFICE/OUTPT VISIT, EST, LEVL IV, 30-39 MIN: ICD-10-PCS | Mod: S$PBB,,, | Performed by: PSYCHIATRY & NEUROLOGY

## 2021-12-07 NOTE — ASSESSMENT & PLAN NOTE
37yo M with hx of seizures x 2002: staring spells and aura followed by GTC sz  - had a sz on 2/10/18: related to pain and lack of sleep due to pain (teeth extraction-related)  - no sz subsequently      Continue current doses of AEDs:  1) LTG 150mg bid  2) LEV 1000mg bid  - check AED levels     - can use Clonazepam for rescue    Letter given to dentist to state that analgesics are ok for dental procedures/extraction.  Return in 6 months or earlier prn   Implemented All Universal Safety Interventions:  [place] to call system. Call bell, personal items and telephone within reach. Instruct patient to call for assistance. Room bathroom lighting operational. Non-slip footwear when patient is off stretcher. Physically safe environment: no spills, clutter or unnecessary equipment. Stretcher in lowest position, wheels locked, appropriate side rails in place.

## 2021-12-10 LAB
LAMOTRIGINE SERPL-MCNC: 4.1 UG/ML (ref 2–15)
LEVETIRACETAM SERPL-MCNC: 20.2 UG/ML (ref 3–60)

## 2021-12-22 ENCOUNTER — TELEPHONE (OUTPATIENT)
Dept: NEUROLOGY | Facility: CLINIC | Age: 41
End: 2021-12-22
Payer: MEDICAID

## 2021-12-27 ENCOUNTER — TELEPHONE (OUTPATIENT)
Dept: NEUROLOGY | Facility: CLINIC | Age: 41
End: 2021-12-27
Payer: MEDICAID

## 2022-01-04 ENCOUNTER — TELEPHONE (OUTPATIENT)
Dept: NEUROLOGY | Facility: CLINIC | Age: 42
End: 2022-01-04
Payer: MEDICAID

## 2022-01-04 NOTE — TELEPHONE ENCOUNTER
----- Message from Krista Guillaume sent at 1/4/2022 11:19 AM CST -----  Regarding: Medication Inquiry  Regarding:pt called to nurse to speak about medication effecting a procedure with dentist.       Call back number:[phone]

## 2022-02-28 DIAGNOSIS — G47.00 INSOMNIA, UNSPECIFIED TYPE: ICD-10-CM

## 2022-03-02 RX ORDER — TRAZODONE HYDROCHLORIDE 50 MG/1
50 TABLET ORAL DAILY
Qty: 30 TABLET | Refills: 5 | Status: SHIPPED | OUTPATIENT
Start: 2022-03-02 | End: 2023-01-23

## 2022-04-05 ENCOUNTER — TELEPHONE (OUTPATIENT)
Dept: NEUROLOGY | Facility: CLINIC | Age: 42
End: 2022-04-05
Payer: MEDICAID

## 2022-04-05 NOTE — TELEPHONE ENCOUNTER
----- Message from Rafia Estes sent at 4/5/2022  8:30 AM CDT -----  Regarding: pt  Pt is calling to speak with the nurse in ref to his seizure medication pt said he has a question he needs to ask. Can you please call pt at [phone].    ALEKSANDRA

## 2022-09-02 ENCOUNTER — TELEPHONE (OUTPATIENT)
Dept: NEUROLOGY | Facility: CLINIC | Age: 42
End: 2022-09-02
Payer: MEDICAID

## 2022-09-02 NOTE — TELEPHONE ENCOUNTER
----- Message from Rajinder Kenney sent at 9/2/2022  9:17 AM CDT -----  Regarding: advise  Contact: PT @ [phone]  Pt is calling to speak to someone in Dr. Kenney' office to discuss how he has feeling and he has some questions and concerns. Please call. Thanks.

## 2022-10-17 ENCOUNTER — TELEPHONE (OUTPATIENT)
Dept: NEUROLOGY | Facility: CLINIC | Age: 42
End: 2022-10-17
Payer: MEDICAID

## 2022-10-17 NOTE — TELEPHONE ENCOUNTER
----- Message from Mckayla Diego sent at 10/17/2022  9:16 AM CDT -----  Contact: Pt [phone]  Pt wife is calling to make an appt for Epilepsy (Seizures, Convulsions, EMU follow-up) no available dates please call and advise @ [phone]

## 2022-10-20 ENCOUNTER — OFFICE VISIT (OUTPATIENT)
Dept: NEUROLOGY | Facility: CLINIC | Age: 42
End: 2022-10-20
Payer: MEDICAID

## 2022-10-20 VITALS
SYSTOLIC BLOOD PRESSURE: 128 MMHG | HEIGHT: 74 IN | DIASTOLIC BLOOD PRESSURE: 79 MMHG | HEART RATE: 72 BPM | BODY MASS INDEX: 36.94 KG/M2

## 2022-10-20 DIAGNOSIS — G47.00 INSOMNIA, UNSPECIFIED TYPE: ICD-10-CM

## 2022-10-20 DIAGNOSIS — R45.4 IRRITABILITY: ICD-10-CM

## 2022-10-20 DIAGNOSIS — G40.209 COMPLEX PARTIAL SEIZURES EVOLVING TO GENERALIZED TONIC-CLONIC SEIZURES: Primary | ICD-10-CM

## 2022-10-20 PROCEDURE — 99215 OFFICE O/P EST HI 40 MIN: CPT | Mod: S$PBB,,,

## 2022-10-20 PROCEDURE — 3074F SYST BP LT 130 MM HG: CPT | Mod: CPTII,,,

## 2022-10-20 PROCEDURE — 3078F DIAST BP <80 MM HG: CPT | Mod: CPTII,,,

## 2022-10-20 PROCEDURE — 99999 PR PBB SHADOW E&M-EST. PATIENT-LVL III: CPT | Mod: PBBFAC,,,

## 2022-10-20 PROCEDURE — 99213 OFFICE O/P EST LOW 20 MIN: CPT | Mod: PBBFAC

## 2022-10-20 PROCEDURE — 99999 PR PBB SHADOW E&M-EST. PATIENT-LVL III: ICD-10-PCS | Mod: PBBFAC,,,

## 2022-10-20 PROCEDURE — 1159F PR MEDICATION LIST DOCUMENTED IN MEDICAL RECORD: ICD-10-PCS | Mod: CPTII,,,

## 2022-10-20 PROCEDURE — 3074F PR MOST RECENT SYSTOLIC BLOOD PRESSURE < 130 MM HG: ICD-10-PCS | Mod: CPTII,,,

## 2022-10-20 PROCEDURE — 99215 PR OFFICE/OUTPT VISIT, EST, LEVL V, 40-54 MIN: ICD-10-PCS | Mod: S$PBB,,,

## 2022-10-20 PROCEDURE — 1159F MED LIST DOCD IN RCRD: CPT | Mod: CPTII,,,

## 2022-10-20 PROCEDURE — 3078F PR MOST RECENT DIASTOLIC BLOOD PRESSURE < 80 MM HG: ICD-10-PCS | Mod: CPTII,,,

## 2022-10-20 RX ORDER — ZONISAMIDE 100 MG/1
400 CAPSULE ORAL NIGHTLY
Qty: 360 CAPSULE | Refills: 3 | Status: SHIPPED | OUTPATIENT
Start: 2022-10-20 | End: 2023-01-23

## 2022-10-20 NOTE — PATIENT INSTRUCTIONS
You came to [facility] because of your seizure disorder.  Your seizures are partially controlled on levetiracetam (keppra) 1500mg twice daily and lamotrigine 150mg twice daily.  Please continue the same medications at the same dose.     Because you are experiencing increased irritability on the keppra, we will try a different medication for seizures called zonisamide. Please follow the schedule below in order to increase this medication safely and decrease the keppra:    10/21/2022  - start zonisamide 1 tablet (100mg) at night   - continue keppra 1500mg twice daily  - continue lamotrigine 150mg twice daily     In one week, 10/28/2022  - increase zonisamide to 2 tablets (200mg) at night   - continue keppra 1500mg twice daily  - continue lamotrigine 150mg twice daily     In one week, 11/4/2022  - increase zonisamide to 3 tablets (300mg) at night   - continue keppra 1500mg twice daily  - continue lamotrigine 150mg twice daily     In one week, 11/11/2022  - increase zonisamide to 4 tablets (400mg) at night   - continue keppra 1500mg twice daily  - continue lamotrigine 150mg twice daily     In one week, 11/18/2022  - continue zonisamide 4 tablets at night   - decrease keppra to 1000mg twice daily   - continue lamotrigine 150mg twice daily     In one week, 11/25/2022  - continue zonisamide 4 tablets at night   - stop keppra  - continue lamotrigine 150mg twice daily     Please drink plenty of water when taking this medication as kidney stones are a potential side effect.     Do not miss any doses of medication. If a dose of medication is missed, take it as soon as it is remembered even if that means doubling up on the dose. Get regular sleep. Go to sleep at the same time and wake up at the same time every day. People with epilepsy require more sleep than people without epilepsy.  Sleeping 10-12 hours a day can be normal for a person with epilepsy.  Every seizure makes it harder to prevent the next seizure. Epilepsy is  associated with SUDEP, or sudden unexpected death in epilepsy.  The risk is significantly higher if convulsive seizures are not well controlled. For more information, check out these websites: https://www.epilepsy.com/, https://www.epilepsyallianceamerica.org/, www.romeo-epilepsy.org, www.womenandepilepsy.org.      Per [place] law, no episodes of loss of consciousness for 6 months before driving.  Avoid dangerous situations.  For example, no baths/pools alone, no heights, no power tools.  Wear a bike helmet.  If breakthrough seizures occur that are different in character, frequency, or duration from normal episodes, please patient portal me or call the office and we will decide the next steps. If multiple seizures occur in a row without return back to baseline, 911 needs to be called.     Return to clinic in 3 months or sooner with issues.  Please patient portal with any questions or concerns.    Jessica Sheldon PA-C   Neurology-Epilepsy  [facility]
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
## 2022-12-03 ENCOUNTER — PATIENT MESSAGE (OUTPATIENT)
Dept: NEUROLOGY | Facility: CLINIC | Age: 42
End: 2022-12-03
Payer: MEDICAID

## 2022-12-03 ENCOUNTER — NURSE TRIAGE (OUTPATIENT)
Dept: ADMINISTRATIVE | Facility: CLINIC | Age: 42
End: 2022-12-03
Payer: MEDICAID

## 2022-12-03 NOTE — TELEPHONE ENCOUNTER
Pt in ED with wife at this time. Wife stated that pt had 2 seizures. Encounter routed to provider  Reason for Disposition   Patient already left for the hospital/clinic.    Protocols used: No Contact or Duplicate Contact Call-A-AH

## 2022-12-05 DIAGNOSIS — G40.209 COMPLEX PARTIAL SEIZURES EVOLVING TO GENERALIZED TONIC-CLONIC SEIZURES: Primary | ICD-10-CM

## 2022-12-05 RX ORDER — LAMOTRIGINE 150 MG/1
TABLET ORAL
Qty: 270 TABLET | Refills: 3 | Status: SHIPPED | OUTPATIENT
Start: 2022-12-05 | End: 2023-01-23

## 2022-12-06 NOTE — PROGRESS NOTES
Continued breakthrough seizures after discontinuing levetiracetam on zonisamide 400mg qhs and lamotrigine 150mg BID.     Trial of:    Brivaracetam 100mg BID  Lamotrigine 150/300  Zonisamide 400mg qhs    Jessica Sheldon PA-C   Neurology-Epilepsy  [facility]

## 2023-01-13 PROBLEM — K02.9: Status: ACTIVE | Noted: 2023-01-13

## 2023-01-23 ENCOUNTER — OFFICE VISIT (OUTPATIENT)
Dept: NEUROLOGY | Facility: CLINIC | Age: 43
End: 2023-01-23
Payer: MEDICAID

## 2023-01-23 VITALS
HEIGHT: 74 IN | HEART RATE: 55 BPM | WEIGHT: 248.13 LBS | SYSTOLIC BLOOD PRESSURE: 108 MMHG | BODY MASS INDEX: 31.84 KG/M2 | DIASTOLIC BLOOD PRESSURE: 68 MMHG

## 2023-01-23 DIAGNOSIS — G40.209 COMPLEX PARTIAL SEIZURES EVOLVING TO GENERALIZED TONIC-CLONIC SEIZURES: Primary | ICD-10-CM

## 2023-01-23 DIAGNOSIS — R21 RASH: ICD-10-CM

## 2023-01-23 PROCEDURE — 3074F PR MOST RECENT SYSTOLIC BLOOD PRESSURE < 130 MM HG: ICD-10-PCS | Mod: CPTII,,,

## 2023-01-23 PROCEDURE — 3074F SYST BP LT 130 MM HG: CPT | Mod: CPTII,,,

## 2023-01-23 PROCEDURE — 1159F MED LIST DOCD IN RCRD: CPT | Mod: CPTII,,,

## 2023-01-23 PROCEDURE — 99999 PR PBB SHADOW E&M-EST. PATIENT-LVL III: CPT | Mod: PBBFAC,,,

## 2023-01-23 PROCEDURE — 3078F PR MOST RECENT DIASTOLIC BLOOD PRESSURE < 80 MM HG: ICD-10-PCS | Mod: CPTII,,,

## 2023-01-23 PROCEDURE — 3008F BODY MASS INDEX DOCD: CPT | Mod: CPTII,,,

## 2023-01-23 PROCEDURE — 99215 PR OFFICE/OUTPT VISIT, EST, LEVL V, 40-54 MIN: ICD-10-PCS | Mod: S$PBB,,,

## 2023-01-23 PROCEDURE — 99213 OFFICE O/P EST LOW 20 MIN: CPT | Mod: PBBFAC

## 2023-01-23 PROCEDURE — 99215 OFFICE O/P EST HI 40 MIN: CPT | Mod: S$PBB,,,

## 2023-01-23 PROCEDURE — 3078F DIAST BP <80 MM HG: CPT | Mod: CPTII,,,

## 2023-01-23 PROCEDURE — 99999 PR PBB SHADOW E&M-EST. PATIENT-LVL III: ICD-10-PCS | Mod: PBBFAC,,,

## 2023-01-23 PROCEDURE — 3008F PR BODY MASS INDEX (BMI) DOCUMENTED: ICD-10-PCS | Mod: CPTII,,,

## 2023-01-23 PROCEDURE — 1159F PR MEDICATION LIST DOCUMENTED IN MEDICAL RECORD: ICD-10-PCS | Mod: CPTII,,,

## 2023-01-23 RX ORDER — LAMOTRIGINE 150 MG/1
300 TABLET ORAL 2 TIMES DAILY
Qty: 360 TABLET | Refills: 3 | Status: SHIPPED | OUTPATIENT
Start: 2023-01-23 | End: 2023-04-03 | Stop reason: SDUPTHER

## 2023-01-23 RX ORDER — MIDAZOLAM 5 MG/.1ML
1 SPRAY NASAL
Qty: 1 EACH | Refills: 2 | Status: SHIPPED | OUTPATIENT
Start: 2023-01-23

## 2023-01-23 NOTE — PATIENT INSTRUCTIONS
You came to Epilepsy Clinic because of your seizure disorder. Your seizures are currently controlled on lamotrigine 150mg in the morning and 300mg at night, briviact 100mg twice daily, and zonisamide 400mg at night.     I think the rash you have is because of the zonisamide. Please increase the lamotrigine to 2 pills in the morning and 2 pills at night (300mg twice daily).     After one week, decrease zonisamide to 3 pills at night. After one week, decrease zonisamide to 2 pills at night. After one week, decrease zonisamide to 1 pill at night. Then after one week discontinue.     Continue taking lamotrigine 2 pills twice daily and briviact 1 pill twice daily.     I have also sent in a prescription for nayzilam spray to be used in the event of a seizure cluster or prolonged seizure. Administer one spray in one nostril as needed for rescue. After 10 minutes if seizure activity continues, an additional spray can be administered in the other nostril. If patient does not return to baseline, 911 needs to be called. It is recommended that you do not use this medication to treat more than one episode every three days, and no more than five episodes per month.     Do not miss any doses of medication. If a dose of medication is missed, take it as soon as it is remembered even if that means doubling up on the dose. Get regular sleep. Go to sleep at the same time and wake up at the same time every day. People with epilepsy require more sleep than people without epilepsy.  Sleeping 10-12 hours a day can be normal for a person with epilepsy.  Every seizure makes it harder to prevent the next seizure. Epilepsy is associated with SUDEP, or sudden unexpected death in epilepsy.  The risk is significantly higher if convulsive seizures are not well controlled. For more information, check out these websites: https://www.epilepsy.com/, https://www.epilepsyallianceamerica.org/, www.romeo-epilepsy.org, www.womenandepilepsy.org.      We can  initiate the surgical pathway today. We will schedule you for a repeat admission to the epilepsy monitoring unit and we will also schedule you for a neuropsych evaluation. I have placed a referral for an evaluation by our neurocognitive team. Please call [phone] during normal working hours and ask for Clare Menon in order to schedule this.     Per [place] law, no episodes of loss of consciousness for 6 months before driving.  Avoid dangerous situations.  For example, no baths/pools alone, no heights, no power tools.  Wear a bike helmet.  If breakthrough seizures occur that are different in character, frequency, or duration from normal episodes, please patient portal me or call the office and we will decide the next steps. If multiple seizures occur in a row without return back to baseline, 911 needs to be called.     Return to clinic in 3 months or sooner with issues.  Please patient portal with any questions or concerns.    Jessica Sheldon PA-C   Neurology-Epilepsy  [facility]
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
## 2023-01-24 ENCOUNTER — TELEPHONE (OUTPATIENT)
Dept: NEUROLOGY | Facility: CLINIC | Age: 43
End: 2023-01-24
Payer: MEDICAID

## 2023-01-24 NOTE — TELEPHONE ENCOUNTER
----- Message from Jessica Sheldon PA-C sent at 1/24/2023  8:17 AM CST -----  Follow up in 3 months. Thanks!    Denise

## 2023-01-24 NOTE — TELEPHONE ENCOUNTER
Spoke with patient about scheduling EMU, aware an adult is required to accompany him for the duration including overnight. He needs to speak with his family about the dates and will call me back. He asked I send my ph# to him in the portal.

## 2023-01-26 ENCOUNTER — TELEPHONE (OUTPATIENT)
Dept: NEUROLOGY | Facility: CLINIC | Age: 43
End: 2023-01-26
Payer: MEDICAID

## 2023-01-26 DIAGNOSIS — G40.209 COMPLEX PARTIAL SEIZURES EVOLVING TO GENERALIZED TONIC-CLONIC SEIZURES: Primary | ICD-10-CM

## 2023-01-26 NOTE — TELEPHONE ENCOUNTER
EMU scheduled 2/9/23, 1pm. Aware he is required to have an adult accompany him for the duration including overnight. Aware he will connected to lines to his head, chest, arm, and have an IV placed; will not be able to leave the room for the duration. Reports he smokes marijuana and is aware he will not be allowed to smoke in the hospital as there is no smoking or vaping on hospital grounds. Instructions thoroughly discussed, mailed via [employer] and sent in portal.

## 2023-02-09 ENCOUNTER — HOSPITAL ENCOUNTER (INPATIENT)
Facility: HOSPITAL | Age: 43
LOS: 4 days | Discharge: HOME | DRG: 101 | End: 2023-02-13
Attending: PSYCHIATRY & NEUROLOGY | Admitting: PSYCHIATRY & NEUROLOGY
Payer: MEDICAID

## 2023-02-09 DIAGNOSIS — G40.209 COMPLEX PARTIAL SEIZURES EVOLVING TO GENERALIZED TONIC-CLONIC SEIZURES: Primary | ICD-10-CM

## 2023-02-09 DIAGNOSIS — G40.909 EPILEPSY: ICD-10-CM

## 2023-02-09 PROBLEM — G47.33 OBSTRUCTIVE SLEEP APNEA SYNDROME: Status: RESOLVED | Noted: 2019-03-08 | Resolved: 2023-02-09

## 2023-02-09 LAB
ALBUMIN SERPL BCP-MCNC: 3.5 G/DL (ref 3.5–5.2)
ALP SERPL-CCNC: 72 U/L (ref 55–135)
ALT SERPL W/O P-5'-P-CCNC: 14 U/L (ref 10–44)
AMPHET+METHAMPHET UR QL: NEGATIVE
ANION GAP SERPL CALC-SCNC: 8 MMOL/L (ref 8–16)
AST SERPL-CCNC: 12 U/L (ref 10–40)
BARBITURATES UR QL SCN>200 NG/ML: NEGATIVE
BASOPHILS # BLD AUTO: 0.05 K/UL (ref 0–0.2)
BASOPHILS NFR BLD: 0.6 % (ref 0–1.9)
BENZODIAZ UR QL SCN>200 NG/ML: NEGATIVE
BILIRUB SERPL-MCNC: 0.2 MG/DL (ref 0.1–1)
BUN SERPL-MCNC: 13 MG/DL (ref 6–20)
BZE UR QL SCN: NEGATIVE
CALCIUM SERPL-MCNC: 8.6 MG/DL (ref 8.7–10.5)
CANNABINOIDS UR QL SCN: ABNORMAL
CHLORIDE SERPL-SCNC: 112 MMOL/L (ref 95–110)
CO2 SERPL-SCNC: 20 MMOL/L (ref 23–29)
CREAT SERPL-MCNC: 1 MG/DL (ref 0.5–1.4)
CREAT UR-MCNC: 233 MG/DL (ref 23–375)
DIFFERENTIAL METHOD: ABNORMAL
EOSINOPHIL # BLD AUTO: 0.1 K/UL (ref 0–0.5)
EOSINOPHIL NFR BLD: 1.6 % (ref 0–8)
ERYTHROCYTE [DISTWIDTH] IN BLOOD BY AUTOMATED COUNT: 14.9 % (ref 11.5–14.5)
EST. GFR  (NO RACE VARIABLE): >60 ML/MIN/1.73 M^2
GLUCOSE SERPL-MCNC: 124 MG/DL (ref 70–110)
HCT VFR BLD AUTO: 38.9 % (ref 40–54)
HGB BLD-MCNC: 12.5 G/DL (ref 14–18)
IMM GRANULOCYTES # BLD AUTO: 0.03 K/UL (ref 0–0.04)
IMM GRANULOCYTES NFR BLD AUTO: 0.4 % (ref 0–0.5)
LYMPHOCYTES # BLD AUTO: 4 K/UL (ref 1–4.8)
LYMPHOCYTES NFR BLD: 46.7 % (ref 18–48)
MCH RBC QN AUTO: 30.2 PG (ref 27–31)
MCHC RBC AUTO-ENTMCNC: 32.1 G/DL (ref 32–36)
MCV RBC AUTO: 94 FL (ref 82–98)
METHADONE UR QL SCN>300 NG/ML: NEGATIVE
MONOCYTES # BLD AUTO: 0.5 K/UL (ref 0.3–1)
MONOCYTES NFR BLD: 6 % (ref 4–15)
NEUTROPHILS # BLD AUTO: 3.8 K/UL (ref 1.8–7.7)
NEUTROPHILS NFR BLD: 44.7 % (ref 38–73)
NRBC BLD-RTO: 0 /100 WBC
OPIATES UR QL SCN: NEGATIVE
PCP UR QL SCN>25 NG/ML: NEGATIVE
PLATELET # BLD AUTO: 202 K/UL (ref 150–450)
PMV BLD AUTO: 10 FL (ref 9.2–12.9)
POTASSIUM SERPL-SCNC: 3.4 MMOL/L (ref 3.5–5.1)
PROT SERPL-MCNC: 6.5 G/DL (ref 6–8.4)
RBC # BLD AUTO: 4.14 M/UL (ref 4.6–6.2)
SODIUM SERPL-SCNC: 140 MMOL/L (ref 136–145)
TOXICOLOGY INFORMATION: ABNORMAL
WBC # BLD AUTO: 8.53 K/UL (ref 3.9–12.7)

## 2023-02-09 PROCEDURE — 95720 EEG PHY/QHP EA INCR W/VEEG: CPT | Mod: ,,, | Performed by: PSYCHIATRY & NEUROLOGY

## 2023-02-09 PROCEDURE — 80203 DRUG SCREEN QUANT ZONISAMIDE: CPT | Performed by: STUDENT IN AN ORGANIZED HEALTH CARE EDUCATION/TRAINING PROGRAM

## 2023-02-09 PROCEDURE — 11000001 HC ACUTE MED/SURG PRIVATE ROOM

## 2023-02-09 PROCEDURE — 80053 COMPREHEN METABOLIC PANEL: CPT | Performed by: STUDENT IN AN ORGANIZED HEALTH CARE EDUCATION/TRAINING PROGRAM

## 2023-02-09 PROCEDURE — 25000003 PHARM REV CODE 250: Performed by: STUDENT IN AN ORGANIZED HEALTH CARE EDUCATION/TRAINING PROGRAM

## 2023-02-09 PROCEDURE — 95720 PR EEG, W/VIDEO, CONT RECORD, I&R, >12<26 HRS: ICD-10-PCS | Mod: ,,, | Performed by: PSYCHIATRY & NEUROLOGY

## 2023-02-09 PROCEDURE — 80175 DRUG SCREEN QUAN LAMOTRIGINE: CPT | Performed by: STUDENT IN AN ORGANIZED HEALTH CARE EDUCATION/TRAINING PROGRAM

## 2023-02-09 PROCEDURE — 36415 COLL VENOUS BLD VENIPUNCTURE: CPT | Performed by: STUDENT IN AN ORGANIZED HEALTH CARE EDUCATION/TRAINING PROGRAM

## 2023-02-09 PROCEDURE — 95700 EEG CONT REC W/VID EEG TECH: CPT

## 2023-02-09 PROCEDURE — 99223 PR INITIAL HOSPITAL CARE,LEVL III: ICD-10-PCS | Mod: ,,, | Performed by: PSYCHIATRY & NEUROLOGY

## 2023-02-09 PROCEDURE — 85025 COMPLETE CBC W/AUTO DIFF WBC: CPT | Performed by: STUDENT IN AN ORGANIZED HEALTH CARE EDUCATION/TRAINING PROGRAM

## 2023-02-09 PROCEDURE — 80307 DRUG TEST PRSMV CHEM ANLYZR: CPT | Performed by: STUDENT IN AN ORGANIZED HEALTH CARE EDUCATION/TRAINING PROGRAM

## 2023-02-09 PROCEDURE — 93005 ELECTROCARDIOGRAM TRACING: CPT

## 2023-02-09 PROCEDURE — 93010 EKG 12-LEAD: ICD-10-PCS | Mod: ,,, | Performed by: INTERNAL MEDICINE

## 2023-02-09 PROCEDURE — 95714 VEEG EA 12-26 HR UNMNTR: CPT

## 2023-02-09 PROCEDURE — G0378 HOSPITAL OBSERVATION PER HR: HCPCS

## 2023-02-09 PROCEDURE — 93010 ELECTROCARDIOGRAM REPORT: CPT | Mod: ,,, | Performed by: INTERNAL MEDICINE

## 2023-02-09 PROCEDURE — 99223 1ST HOSP IP/OBS HIGH 75: CPT | Mod: ,,, | Performed by: PSYCHIATRY & NEUROLOGY

## 2023-02-09 PROCEDURE — G0379 DIRECT REFER HOSPITAL OBSERV: HCPCS

## 2023-02-09 RX ORDER — LORAZEPAM 2 MG/ML
2 INJECTION INTRAMUSCULAR EVERY 5 MIN PRN
Status: DISCONTINUED | OUTPATIENT
Start: 2023-02-09 | End: 2023-02-13 | Stop reason: HOSPADM

## 2023-02-09 RX ORDER — DOCUSATE SODIUM 100 MG/1
100 CAPSULE, LIQUID FILLED ORAL 2 TIMES DAILY PRN
Status: DISCONTINUED | OUTPATIENT
Start: 2023-02-09 | End: 2023-02-13 | Stop reason: HOSPADM

## 2023-02-09 RX ORDER — ACETAMINOPHEN 325 MG/1
650 TABLET ORAL EVERY 4 HOURS PRN
Status: DISCONTINUED | OUTPATIENT
Start: 2023-02-09 | End: 2023-02-13 | Stop reason: HOSPADM

## 2023-02-09 RX ORDER — ONDANSETRON 8 MG/1
8 TABLET, ORALLY DISINTEGRATING ORAL EVERY 8 HOURS PRN
Status: DISCONTINUED | OUTPATIENT
Start: 2023-02-09 | End: 2023-02-13 | Stop reason: HOSPADM

## 2023-02-09 RX ORDER — SODIUM CHLORIDE 0.9 % (FLUSH) 0.9 %
10 SYRINGE (ML) INJECTION
Status: DISCONTINUED | OUTPATIENT
Start: 2023-02-09 | End: 2023-02-13 | Stop reason: HOSPADM

## 2023-02-09 RX ORDER — LAMOTRIGINE 150 MG/1
150 TABLET ORAL 2 TIMES DAILY
Status: DISCONTINUED | OUTPATIENT
Start: 2023-02-09 | End: 2023-02-10

## 2023-02-09 RX ADMIN — LAMOTRIGINE 150 MG: 150 TABLET ORAL at 08:02

## 2023-02-09 NOTE — NURSING
EMU NURSING INTERVIEW        Pt admitted to EMU room -904--,   EMU team notified. Yes    Onset-When did your seizures start?21 years ago,2002. Sometimes january    Aura-Do you experience an aura?nauseated, perfuse, flowers.feel the body  hot to cold      Symptoms-What symptoms do you experience?  Jerks like min  to 2 min, heavy breathing  postictal after 5 min later start come back to normal, then get restless for may be SOB.     Triggers-Do you have any Triggers? Emotional stress ,   any kind of excitement .      Do you bite your tongue? Sometimes.    Do become incontinent of bladder or bowels?  Sometimes,     Have you been told your BP or oxygen drops during an event?do not know     Bed locked, bed alarm on and call light in reach. Educated to call staff before ambulating. ducated to use event button when patient feels like they will have an event or when an event is suspected. Pt and family verbalize understanding.

## 2023-02-09 NOTE — SUBJECTIVE & OBJECTIVE
Past Medical History:   Diagnosis Date    Cannabis abuse 3/8/2019    Complex partial seizures evolving to generalized tonic-clonic seizures 7/31/2017    Insomnia 4/7/2018       No past surgical history on file.    Review of patient's allergies indicates:  No Known Allergies    No current facility-administered medications on file prior to encounter.     Current Outpatient Medications on File Prior to Encounter   Medication Sig    amitriptyline (ELAVIL) 25 MG tablet Take 25 mg by mouth every evening.    brivaracetam (BRIVIACT) 100 mg Tab Take 1 tablet (100 mg total) by mouth 2 (two) times daily.    clonazePAM (KLONOPIN) 0.5 MG tablet Take 0.5 mg by mouth 2 (two) times daily.    ibuprofen (ADVIL,MOTRIN) 800 MG tablet Take 800 mg by mouth every 6 (six) hours.    indomethacin (INDOCIN) 25 MG capsule indomethacin 25 mg capsule    lamoTRIgine (LAMICTAL) 150 MG Tab Take 2 tablets (300 mg total) by mouth 2 (two) times daily.    midazolam (NAYZILAM) 5 mg/spray (0.1 mL) Spry 1 spray by Nasal route as needed (for prolonged seizure or seizure cluster).     Continuous Infusions:    Family History    None       Tobacco Use    Smoking status: Every Day    Smokeless tobacco: Not on file   Substance and Sexual Activity    Alcohol use: Not on file    Drug use: Not on file    Sexual activity: Not on file     Review of Systems   Constitutional:  Negative for activity change, appetite change, diaphoresis and fever.   HENT:  Negative for sore throat and trouble swallowing.    Eyes:  Negative for photophobia and visual disturbance.   Respiratory:  Negative for chest tightness, shortness of breath and wheezing.    Cardiovascular:  Negative for chest pain, palpitations and leg swelling.   Gastrointestinal:  Negative for abdominal distention, abdominal pain, nausea and vomiting.   Genitourinary:  Negative for difficulty urinating and dysuria.   Skin:  Negative for color change and pallor.   Neurological:  Negative for dizziness, facial  asymmetry, speech difficulty, weakness and light-headedness.   Psychiatric/Behavioral:  Negative for agitation, confusion and decreased concentration.    Objective:     Vital Signs (Most Recent):  Temp: 98.3 °F (36.8 °C) (02/09/23 1609)  Pulse: (!) 52 (02/09/23 1609)  Resp: 18 (02/09/23 1609)  BP: (!) 127/59 (02/09/23 1609)  SpO2: 98 % (02/09/23 1609)   Vital Signs (24h Range):  Temp:  [97.7 °F (36.5 °C)-98.3 °F (36.8 °C)] 98.3 °F (36.8 °C)  Pulse:  [52-65] 52  Resp:  [18] 18  SpO2:  [98 %] 98 %  BP: (127-142)/(59-62) 127/59        There is no height or weight on file to calculate BMI.    Physical Exam  Constitutional:       General: He is not in acute distress.     Appearance: He is well-developed.   HENT:      Head: Normocephalic and atraumatic.   Eyes:      Extraocular Movements: EOM normal.      Pupils: Pupils are equal, round, and reactive to light.   Cardiovascular:      Rate and Rhythm: Normal rate and regular rhythm.      Heart sounds: Normal heart sounds.   Pulmonary:      Effort: Pulmonary effort is normal. No respiratory distress.      Breath sounds: Normal breath sounds. No wheezing.   Chest:      Chest wall: No tenderness.   Abdominal:      General: Bowel sounds are normal. There is no distension.      Palpations: Abdomen is soft.      Tenderness: There is no abdominal tenderness.   Musculoskeletal:         General: Normal range of motion.      Cervical back: Neck supple.   Skin:     General: Skin is warm and dry.      Capillary Refill: Capillary refill takes less than 2 seconds.   Neurological:      Mental Status: He is alert and oriented to person, place, and time.      Cranial Nerves: No cranial nerve deficit.      Coordination: Coordination normal. Finger-Nose-Finger Test normal.      Gait: Gait is intact.      Deep Tendon Reflexes: Reflexes normal.      Reflex Scores:       Bicep reflexes are 2+ on the right side and 2+ on the left side.       Brachioradialis reflexes are 2+ on the right side and  2+ on the left side.       Patellar reflexes are 2+ on the right side and 2+ on the left side.  Psychiatric:         Thought Content: Thought content normal.       NEUROLOGICAL EXAMINATION:     MENTAL STATUS   Oriented to person, place, and time.   Level of consciousness: alert    CRANIAL NERVES     CN II   Visual fields full to confrontation.     CN III, IV, VI   Pupils are equal, round, and reactive to light.  Extraocular motions are normal.     CN V   Facial sensation intact.     CN VII   Facial expression full, symmetric.     CN VIII   Hearing: intact    CN IX, X   CN IX normal.     CN XI   CN XI normal.     MOTOR EXAM        Strength 5/5 throughout all 4 extremities.     REFLEXES     Reflexes   Right brachioradialis: 2+  Left brachioradialis: 2+  Right biceps: 2+  Left biceps: 2+  Right patellar: 2+  Left patellar: 2+    SENSORY EXAM   Light touch normal.     GAIT AND COORDINATION     Gait  Gait: normal     Coordination   Finger to nose coordination: normal    Significant Labs: All pertinent lab results from the past 24 hours have been reviewed.    Significant Studies: I have reviewed all pertinent imaging results/findings within the past 24 hours.

## 2023-02-09 NOTE — NURSING
Nurses Note -- 4 Eyes      2/9/2023   5:28 PM      Skin assessed during: Admit      [x] No Pressure Injuries Present    []Prevention Measures Documented      [] Yes- Altered Skin Integrity Present or Discovered   [] LDA Added if Not in Epic (Describe Wound)   [] New Altered Skin Integrity was Present on Admit and Documented in LDA   [] Wound Image Taken    Wound Care Consulted? No    Attending Nurse:  Nicki Kern RN     Second RN/Staff Member:  CHINYERE Ascencio

## 2023-02-09 NOTE — ASSESSMENT & PLAN NOTE
43-year-old male with past medical history of epilepsy who presents for emu admission for surgical evaluation.  His events began in 2002 and consist 2 different seizure types-GTCs and staring spells.  His GTC events occur primarily during sleep and begin with an aura of strange smells and nausea, followed by loss of consciousness, and jerking of all 4 extremities for 1-2 minutes.  He is then postictal for 5-10 minutes with confusion, difficulty talking, and sometimes somnolence. He sometimes has tongue biting and urinary incontinence. He also has staring spells, which last up to a minute, during which he is unresponsive, and sometimes proceeds to GTC events.  They are triggered by poor sleep and missed doses of medication.    Plan  - Admit to EMU for continuous vEEG  - Hold Briviact and zonisamide while inpatient for event capture  - Decrease Lamotrigine to 150 mg BID  - CMC, CMP, EKG, AED levels   - Lamotrigine and zonisamide levels- pending  - UDS + for THC  - Activation procedures per protocol   - IV Ativan PRN for GTC greater than 5 min. If seizure activity continues >5 minutes after first dose, administer a second dose and call epilepsy on call.  - Seizure precautions, suction and oxygen at bedside  - Fall precautions, side rail padding in place  - Visi monitoring with continuous pulse oximetry   - Telemetry
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
## 2023-02-09 NOTE — PLAN OF CARE
Problem: Seizure, Active Management  Goal: Absence of Seizure/Seizure-Related Injury  Outcome: Ongoing, Progressing     Problem: Adult Inpatient Plan of Care  Goal: Absence of Hospital-Acquired Illness or Injury  Outcome: Ongoing, Progressing     POC reviewed with the patient and the family ,they  verbalized understanding. All comments and concerns addressed. Bed locked in lowest position with bed alarm set, call light within reach. Safety precautions maintained. VSS, see flowsheets. No events this shift. Will continue to monitor for changes to POC and clinical condition.
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
## 2023-02-10 PROCEDURE — 11000001 HC ACUTE MED/SURG PRIVATE ROOM

## 2023-02-10 PROCEDURE — 99233 SBSQ HOSP IP/OBS HIGH 50: CPT | Mod: ,,, | Performed by: PSYCHIATRY & NEUROLOGY

## 2023-02-10 PROCEDURE — 95720 PR EEG, W/VIDEO, CONT RECORD, I&R, >12<26 HRS: ICD-10-PCS | Mod: ,,, | Performed by: PSYCHIATRY & NEUROLOGY

## 2023-02-10 PROCEDURE — 99233 PR SUBSEQUENT HOSPITAL CARE,LEVL III: ICD-10-PCS | Mod: ,,, | Performed by: PSYCHIATRY & NEUROLOGY

## 2023-02-10 PROCEDURE — 25000003 PHARM REV CODE 250: Performed by: STUDENT IN AN ORGANIZED HEALTH CARE EDUCATION/TRAINING PROGRAM

## 2023-02-10 PROCEDURE — 95714 VEEG EA 12-26 HR UNMNTR: CPT

## 2023-02-10 PROCEDURE — 95720 EEG PHY/QHP EA INCR W/VEEG: CPT | Mod: ,,, | Performed by: PSYCHIATRY & NEUROLOGY

## 2023-02-10 RX ORDER — LAMOTRIGINE 25 MG/1
50 TABLET ORAL 2 TIMES DAILY
Status: COMPLETED | OUTPATIENT
Start: 2023-02-10 | End: 2023-02-11

## 2023-02-10 RX ADMIN — LAMOTRIGINE 150 MG: 150 TABLET ORAL at 07:02

## 2023-02-10 RX ADMIN — LAMOTRIGINE 50 MG: 25 TABLET ORAL at 08:02

## 2023-02-10 NOTE — PROGRESS NOTES
[facility] - Neurosurgery ([facility])  Neurology-Epilepsy  Progress Note    Patient Name: Young Joyner  MRN: [id number]  Admission Date: 2/9/2023  Hospital Length of Stay: 0 days  Code Status: Full Code   Attending Provider: Lissa Reynolds MD PhD  Primary Care Physician: Talon Narvaez MD   Principal Problem:Complex partial seizures evolving to generalized tonic-clonic seizures    Subjective:     Hospital Course:   2/9>2/10: NAEON; no clinical or electrographic seizures noted; decreased lamotrigine to 50 mg x 2 doses then will stop      Interval History: NAEON; no clinical or electrographic seizures noted; decreased lamotrigine to 50 mg x 2 doses then will stop    Review of Systems   Constitutional:  Negative for activity change, appetite change, diaphoresis and fever.   HENT:  Negative for sore throat and trouble swallowing.    Eyes:  Negative for photophobia and visual disturbance.   Respiratory:  Negative for chest tightness, shortness of breath and wheezing.    Cardiovascular:  Negative for chest pain, palpitations and leg swelling.   Gastrointestinal:  Negative for abdominal distention, abdominal pain, nausea and vomiting.   Genitourinary:  Negative for difficulty urinating and dysuria.   Skin:  Negative for color change and pallor.   Neurological:  Negative for dizziness, facial asymmetry, speech difficulty, weakness and light-headedness.   Psychiatric/Behavioral:  Negative for confusion and decreased concentration.         Anxious coming off of medications   Objective:     Vital Signs (Most Recent):  Temp: 99.1 °F (37.3 °C) (02/10/23 1111)  Pulse: 62 (02/10/23 1434)  Resp: 18 (02/10/23 1111)  BP: 119/74 (02/10/23 1111)  SpO2: 99 % (02/10/23 1111)   Vital Signs (24h Range):  Temp:  [96.7 °F (35.9 °C)-99.1 °F (37.3 °C)] 99.1 °F (37.3 °C)  Pulse:  [52-98] 62  Resp:  [16-18] 18  SpO2:  [97 %-99 %] 99 %  BP: (113-133)/(56-83) 119/74     Weight: 116.2 kg (256 lb 2.8 oz)  Body mass index is 31.18  kg/m².    Physical Exam  Constitutional:       General: He is not in acute distress.     Appearance: He is well-developed.   HENT:      Head: Normocephalic and atraumatic.   Eyes:      Extraocular Movements: EOM normal.      Pupils: Pupils are equal, round, and reactive to light.   Cardiovascular:      Rate and Rhythm: Normal rate and regular rhythm.      Heart sounds: Normal heart sounds.   Pulmonary:      Effort: Pulmonary effort is normal. No respiratory distress.      Breath sounds: Normal breath sounds. No wheezing.   Chest:      Chest wall: No tenderness.   Abdominal:      General: Bowel sounds are normal. There is no distension.      Palpations: Abdomen is soft.      Tenderness: There is no abdominal tenderness.   Musculoskeletal:         General: Normal range of motion.      Cervical back: Neck supple.   Skin:     General: Skin is warm and dry.      Capillary Refill: Capillary refill takes less than 2 seconds.   Neurological:      Mental Status: He is alert and oriented to person, place, and time.      Cranial Nerves: No cranial nerve deficit.      Coordination: Coordination normal. Finger-Nose-Finger Test normal.      Gait: Gait is intact.      Deep Tendon Reflexes: Reflexes normal.      Reflex Scores:       Bicep reflexes are 2+ on the right side and 2+ on the left side.       Brachioradialis reflexes are 2+ on the right side and 2+ on the left side.       Patellar reflexes are 2+ on the right side and 2+ on the left side.  Psychiatric:         Thought Content: Thought content normal.       NEUROLOGICAL EXAMINATION:     MENTAL STATUS   Oriented to person, place, and time.   Level of consciousness: alert    CRANIAL NERVES     CN II   Visual fields full to confrontation.     CN III, IV, VI   Pupils are equal, round, and reactive to light.  Extraocular motions are normal.     CN V   Facial sensation intact.     CN VII   Facial expression full, symmetric.     CN VIII   Hearing: intact    CN IX, X   CN IX  normal.     CN XI   CN XI normal.     MOTOR EXAM        Strength 5/5 throughout all 4 extremities.     REFLEXES     Reflexes   Right brachioradialis: 2+  Left brachioradialis: 2+  Right biceps: 2+  Left biceps: 2+  Right patellar: 2+  Left patellar: 2+    SENSORY EXAM   Light touch normal.     GAIT AND COORDINATION     Gait  Gait: normal     Coordination   Finger to nose coordination: normal    Significant Labs: All pertinent lab results from the past 24 hours have been reviewed.    Significant Studies: I have reviewed all pertinent imaging results/findings within the past 24 hours.    Assessment and Plan:     * Complex partial seizures evolving to generalized tonic-clonic seizures  43-year-old male with past medical history of epilepsy who presents for emu admission for surgical evaluation.  His events began in 2002 and consist 2 different seizure types-GTCs and staring spells.  His GTC events occur primarily during sleep and begin with an aura of strange smells and nausea, followed by loss of consciousness, and jerking of all 4 extremities for 1-2 minutes.  He is then postictal for 5-10 minutes with confusion, difficulty talking, and sometimes somnolence. He sometimes has tongue biting and urinary incontinence. He also has staring spells, which last up to a minute, during which he is unresponsive, and sometimes proceeds to GTC events.  They are triggered by poor sleep and missed doses of medication.    2/9>2/10: NAEON; no clinical or electrographic seizures noted; decreased lamotrigine to 50 mg x 2 doses then will stop    Plan  - Admit to EMU for continuous vEEG  - Hold Briviact and zonisamide while inpatient for event capture  - Decreased Lamotrigine to 50 mg BID x 2 doses then will stop  - Lamotrigine and zonisamide levels- pending  - UDS + for THC  - Activation procedures per protocol- HV and photic   - IV Ativan PRN for GTC greater than 5 min. If seizure activity continues >5 minutes after first dose,  administer a second dose and call epilepsy on call.  - Seizure precautions, suction and oxygen at bedside  - Fall precautions, side rail padding in place  - Visi monitoring with continuous pulse oximetry   - Telemetry    Obstructive sleep apnea syndrome  -chronic          VTE Risk Mitigation (From admission, onward)         Ordered     IP VTE LOW RISK PATIENT  Once         02/09/23 2359                Rob Llanes MD  Neurology-Epilepsy  [facility] - Neurosurgery ([facility])

## 2023-02-10 NOTE — PLAN OF CARE
Problem: Adult Inpatient Plan of Care  Goal: Plan of Care Review  Outcome: Ongoing, Progressing     Patient is AAO x4. POC reviewed with patient. Patient verbalized understanding. Patient's breathing is unlabored with equal chest expansion. Patient has continuous EEG monitoring in place. Patient ambulates to the restroom. Patient remained free from falls. Patient rested well through shift. Bed in lowest position, side rails up x4 and padded, no complaints or signs of distress. The event monitor has not been pressed during shift. WCTM during shift.  See flowsheets for full assessment and VS info.

## 2023-02-10 NOTE — PLAN OF CARE
[facility])  Initial Discharge Assessment       Primary Care Provider: Talon Narvaez MD    Admission Diagnosis: Complex partial seizures evolving to generalized tonic-clonic seizures [G40.209]    Admission Date: 2/9/2023  Expected Discharge Date: 2/11/2023    Discharge Barriers Identified: None    Payor: MEDICAID / Plan: [employer] [facility] ([employer]) / Product Type: Managed Medicaid /     Extended Emergency Contact Information  Primary Emergency Contact: Jesus Vegas  Address: [address] [place] [address] [id number] [place] [address]  Home Phone: [phone]  Work Phone: [phone]  Mobile Phone: [phone]  Relation: Spouse    Discharge Plan A: Home with family  Discharge Plan B: Home with family      [id number] - [place], [place] - [address] [facility]  [address] [facility] [address]  Phone: [phone] Fax: [phone]      Initial Assessment (most recent)       Adult Discharge Assessment - 02/10/23 1720          Discharge Assessment    Assessment Type Discharge Planning Assessment     Confirmed/corrected address, phone number and insurance Yes     Confirmed Demographics Correct on Facesheet     Source of Information patient     When was your last doctors appointment? 01/31/23     Reason For Admission Seizures     People in Home spouse     Do you expect to return to your current living situation? Yes     Do you have help at home or someone to help you manage your care at home? Yes     Who are your caregiver(s) and their phone number(s)? Jose D Vegas, Wife, [phone]     Prior to hospitilization cognitive status: Alert/Oriented     Current cognitive status: Alert/Oriented     Walking or Climbing Stairs ambulation difficulty, requires equipment     Equipment Currently Used at Home none     Readmission within 30 days? No     Patient currently being followed by outpatient case management? No     Do you currently have service(s) that help you  manage your care at home? No     Do you take prescription medications? Yes     Do you have prescription coverage? Yes     Do you have any problems affording any of your prescribed medications? No     Who is going to help you get home at discharge? Jose D Vegas, Wife, [phone]     How do you get to doctors appointments? family or friend will provide     Are you on dialysis? No     Do you take coumadin? No     Discharge Plan A Home with family     Discharge Plan B Home with family     DME Needed Upon Discharge  none     Discharge Plan discussed with: Spouse/sig other;Patient     Discharge Barriers Identified None                      The CM met with the patient and patients wife at bedside to complete the DPA.  The CM placed name and contact information on the blackboard in the patient's room.  Use preferred pharmacy / bedside delivery for any necessary  medications at the time of discharge.The patient is independent with all ADLs. The patient is not on Dialysis or Coumadin. The patient's wife will provide assistance to the patient upon discharge. The patient's wife will provide transportation upon discharge .  The CM will continue to follow for course of hospitalization.

## 2023-02-10 NOTE — SUBJECTIVE & OBJECTIVE
Interval History: NAEON; no clinical or electrographic seizures noted; decreased lamotrigine to 50 mg x 2 doses then will stop    Review of Systems   Constitutional:  Negative for activity change, appetite change, diaphoresis and fever.   HENT:  Negative for sore throat and trouble swallowing.    Eyes:  Negative for photophobia and visual disturbance.   Respiratory:  Negative for chest tightness, shortness of breath and wheezing.    Cardiovascular:  Negative for chest pain, palpitations and leg swelling.   Gastrointestinal:  Negative for abdominal distention, abdominal pain, nausea and vomiting.   Genitourinary:  Negative for difficulty urinating and dysuria.   Skin:  Negative for color change and pallor.   Neurological:  Negative for dizziness, facial asymmetry, speech difficulty, weakness and light-headedness.   Psychiatric/Behavioral:  Negative for confusion and decreased concentration.         Anxious coming off of medications   Objective:     Vital Signs (Most Recent):  Temp: 99.1 °F (37.3 °C) (02/10/23 1111)  Pulse: 62 (02/10/23 1434)  Resp: 18 (02/10/23 1111)  BP: 119/74 (02/10/23 1111)  SpO2: 99 % (02/10/23 1111)   Vital Signs (24h Range):  Temp:  [96.7 °F (35.9 °C)-99.1 °F (37.3 °C)] 99.1 °F (37.3 °C)  Pulse:  [52-98] 62  Resp:  [16-18] 18  SpO2:  [97 %-99 %] 99 %  BP: (113-133)/(56-83) 119/74     Weight: 116.2 kg (256 lb 2.8 oz)  Body mass index is 31.18 kg/m².    Physical Exam  Constitutional:       General: He is not in acute distress.     Appearance: He is well-developed.   HENT:      Head: Normocephalic and atraumatic.   Eyes:      Extraocular Movements: EOM normal.      Pupils: Pupils are equal, round, and reactive to light.   Cardiovascular:      Rate and Rhythm: Normal rate and regular rhythm.      Heart sounds: Normal heart sounds.   Pulmonary:      Effort: Pulmonary effort is normal. No respiratory distress.      Breath sounds: Normal breath sounds. No wheezing.   Chest:      Chest wall: No  tenderness.   Abdominal:      General: Bowel sounds are normal. There is no distension.      Palpations: Abdomen is soft.      Tenderness: There is no abdominal tenderness.   Musculoskeletal:         General: Normal range of motion.      Cervical back: Neck supple.   Skin:     General: Skin is warm and dry.      Capillary Refill: Capillary refill takes less than 2 seconds.   Neurological:      Mental Status: He is alert and oriented to person, place, and time.      Cranial Nerves: No cranial nerve deficit.      Coordination: Coordination normal. Finger-Nose-Finger Test normal.      Gait: Gait is intact.      Deep Tendon Reflexes: Reflexes normal.      Reflex Scores:       Bicep reflexes are 2+ on the right side and 2+ on the left side.       Brachioradialis reflexes are 2+ on the right side and 2+ on the left side.       Patellar reflexes are 2+ on the right side and 2+ on the left side.  Psychiatric:         Thought Content: Thought content normal.       NEUROLOGICAL EXAMINATION:     MENTAL STATUS   Oriented to person, place, and time.   Level of consciousness: alert    CRANIAL NERVES     CN II   Visual fields full to confrontation.     CN III, IV, VI   Pupils are equal, round, and reactive to light.  Extraocular motions are normal.     CN V   Facial sensation intact.     CN VII   Facial expression full, symmetric.     CN VIII   Hearing: intact    CN IX, X   CN IX normal.     CN XI   CN XI normal.     MOTOR EXAM        Strength 5/5 throughout all 4 extremities.     REFLEXES     Reflexes   Right brachioradialis: 2+  Left brachioradialis: 2+  Right biceps: 2+  Left biceps: 2+  Right patellar: 2+  Left patellar: 2+    SENSORY EXAM   Light touch normal.     GAIT AND COORDINATION     Gait  Gait: normal     Coordination   Finger to nose coordination: normal    Significant Labs: All pertinent lab results from the past 24 hours have been reviewed.    Significant Studies: I have reviewed all pertinent imaging  results/findings within the past 24 hours.

## 2023-02-10 NOTE — HOSPITAL COURSE
2/9>2/10: NAEON; no clinical or electrographic seizures noted; decreased lamotrigine to 50 mg x 2 doses then will stop  2/10>2/11: NAEON; HV and photic performed; lamotrigine stopped; patient with left sided discharges noted on EEG, but no discrete clinical or electrographic seizures   2/11>2/12: patient with 2 clinical and electrographic seizure events overnight @ 2318 on 2/11 and @ 0311 2/12, and one additional clinical seizure event @ 0645 that was not recorded on EEG as patient had inadvertently removed leads during previous event; started lamotrigine 150 mg BID, and plan to restart full home AED regimen AM of 2/13 or if 1 more event is captured; patient back to baseline  2/12>2/13: patient with 3 additional electrographic and clinical seizure events, with semiology similar to priors (one event with less severe clinical symptoms but view obstructed by bedding) and electrographically beginning in the left frontotemporal region associated with oral automatisms and a prominent right head/eye version which then secondarily generalize; 5 total events captured during this admission; expected discharge today, with MRI ordered and neuropsychology referral placed outpatient to continue surgical workup; discharge home meds- Brivaracetam 100 mg BID, lamotrigine 300 mg BID, and clobazam 10 mg qhs

## 2023-02-10 NOTE — PLAN OF CARE
Problem: Seizure, Active Management  Goal: Absence of Seizure/Seizure-Related Injury  Outcome: Ongoing, Progressing     Problem: Adult Inpatient Plan of Care  Goal: Absence of Hospital-Acquired Illness or Injury  Outcome: Ongoing, Progressing

## 2023-02-10 NOTE — ASSESSMENT & PLAN NOTE
43-year-old male with past medical history of epilepsy who presents for emu admission for surgical evaluation.  His events began in 2002 and consist 2 different seizure types-GTCs and staring spells.  His GTC events occur primarily during sleep and begin with an aura of strange smells and nausea, followed by loss of consciousness, and jerking of all 4 extremities for 1-2 minutes.  He is then postictal for 5-10 minutes with confusion, difficulty talking, and sometimes somnolence. He sometimes has tongue biting and urinary incontinence. He also has staring spells, which last up to a minute, during which he is unresponsive, and sometimes proceeds to GTC events.  They are triggered by poor sleep and missed doses of medication.    2/9>2/10: NAEON; no clinical or electrographic seizures noted; decreased lamotrigine to 50 mg x 2 doses then will stop    Plan  - Admit to EMU for continuous vEEG  - Hold Briviact and zonisamide while inpatient for event capture  - Decreased Lamotrigine to 50 mg BID x 2 doses then will stop  - Lamotrigine and zonisamide levels- pending  - UDS + for THC  - Activation procedures per protocol- HV and photic   - IV Ativan PRN for GTC greater than 5 min. If seizure activity continues >5 minutes after first dose, administer a second dose and call epilepsy on call.  - Seizure precautions, suction and oxygen at bedside  - Fall precautions, side rail padding in place  - Visi monitoring with continuous pulse oximetry   - Telemetry

## 2023-02-10 NOTE — PLAN OF CARE
Problem: Seizure, Active Management  Goal: Absence of Seizure/Seizure-Related Injury  2/10/2023 1110 by Herman Duggan RN  Outcome: Ongoing, Progressing     Problem: Adult Inpatient Plan of Care  Goal: Absence of Hospital-Acquired Illness or Injury  2/10/2023 1110 by Herman Duggan RN  Outcome: Ongoing, Progressing     POC reviewed with the patient and the family verbalized understanding. All comments and concerns addressed. Bed locked in lowest position with bed alarm set, call light within reach. Safety precautions maintained. VSS, see flowsheets. No events this shift. Will continue to monitor for changes to POC and clinical condition.

## 2023-02-11 ENCOUNTER — DOCUMENTATION ONLY (OUTPATIENT)
Dept: NEUROLOGY | Facility: CLINIC | Age: 43
End: 2023-02-11
Payer: MEDICAID

## 2023-02-11 LAB — ZONISAMIDE SERPL-MCNC: 11 MCG/ML (ref 10–40)

## 2023-02-11 PROCEDURE — 11000001 HC ACUTE MED/SURG PRIVATE ROOM

## 2023-02-11 PROCEDURE — 99233 PR SUBSEQUENT HOSPITAL CARE,LEVL III: ICD-10-PCS | Mod: ,,, | Performed by: PSYCHIATRY & NEUROLOGY

## 2023-02-11 PROCEDURE — 95714 VEEG EA 12-26 HR UNMNTR: CPT

## 2023-02-11 PROCEDURE — 99233 SBSQ HOSP IP/OBS HIGH 50: CPT | Mod: ,,, | Performed by: PSYCHIATRY & NEUROLOGY

## 2023-02-11 PROCEDURE — 25000003 PHARM REV CODE 250: Performed by: STUDENT IN AN ORGANIZED HEALTH CARE EDUCATION/TRAINING PROGRAM

## 2023-02-11 PROCEDURE — 95720 PR EEG, W/VIDEO, CONT RECORD, I&R, >12<26 HRS: ICD-10-PCS | Mod: ,,, | Performed by: PSYCHIATRY & NEUROLOGY

## 2023-02-11 PROCEDURE — 95720 EEG PHY/QHP EA INCR W/VEEG: CPT | Mod: ,,, | Performed by: PSYCHIATRY & NEUROLOGY

## 2023-02-11 PROCEDURE — 94761 N-INVAS EAR/PLS OXIMETRY MLT: CPT

## 2023-02-11 RX ADMIN — ACETAMINOPHEN 650 MG: 325 TABLET ORAL at 08:02

## 2023-02-11 RX ADMIN — ONDANSETRON 8 MG: 8 TABLET, ORALLY DISINTEGRATING ORAL at 11:02

## 2023-02-11 RX ADMIN — LAMOTRIGINE 50 MG: 25 TABLET ORAL at 08:02

## 2023-02-11 NOTE — PROCEDURES
[facility] EEG/VIDEO MONITORING REPORT  Young Joyner  [id number]  1980    DATE OF SERVICE:  02/10/2023  DATE OF ADMISSION: 2/9/2023  2:15 PM    ADMITTING/REQUESTING PROVIDER: Lissa Reynolds MD PhD    REASON FOR CONSULT:  43-year-old man with episodes of staring and generalized convulsions on brivaracetam, lamotrigine, and zonisamide admitted to the epilepsy monitoring unit for phase 1 epilepsy pre-surgical evaluation off medications.  Evaluate for evidence of epileptiform activity.    METHODOLOGY   Electroencephalographic (EEG) recording is with electrodes placed according to the International 10-20 placement system.  Thirty two (32) channels of digital signal (sampling rate of 512/sec) including T1 and T2 was simultaneously recorded from the scalp and may include  EKG, EMG, and/or eye monitors.  Recording band pass was 0.1 to 512 hz.  Digital video recording of the patient is simultaneously recorded with the EEG.  The patient is instructed report clinical symptoms which may occur during the recording session.  EEG and video recording is stored and archived in digital format.  Activation procedures which include photic stimulation, hyperventilation and instructing patients to perform simple task are done in selected patients.   The EEG is displayed on a monitor screen and can be reviewed using different montages.  Computer assisted analysis is employed to detect spike and electrographic seizure activity.   The entire record is submitted for computer analysis.  The entire recording is visually reviewed and the times identified by computer analysis as being spikes or seizures are reviewed again.  Compresses spectral analysis (CSA) is also performed on the activity recorded from each individual channel.  This is displayed as a power display of frequencies from 0 to 30 Hz over time.   The CSA is reviewed looking for asymmetries in power between homologous areas of the scalp and then compared with the  original EEG recording.     [employer] software is also utilized in the review of this study.  This software suite analyzes the EEG recording in multiple domains.  Coherence and rhythmicity is computed to identify EEG sections which may contain organized seizures.  Each channel undergoes analysis to detect presence of spike and sharp waves which have special and morphological characteristic of epileptic activity.  The routine EEG recording is converted from spacial into frequency domain.  This is then displayed comparing homologous areas to identify areas of significant asymmetry.  Algorithm to identify non-cortically generated artifact is used to separate eye movement, EMG and other artifact from the EEG.      RECORDING TIMES  Start on 02/10/2023 at 07:00 a.m.  Stop on 02/11/2023 at 07:00 a.m.  A total of 22 hours and 5 minutes of EEG recording is obtained.    EEG FINDINGS  Background activity:   The waking background is continuous and symmetric with a well-formed 10 hz posterior dominant rhythm seen bilaterally.  There are rare sporadic left frontotemporal discharges phase reversing at F7.    There is a single pushbutton activation at 07:08 a.m. that appears accidental while the patient is shifting in bed with no significant changes noted on the electrographic background.    Sleep:  The patient transitions from wakefulness to sleep with the appearance of sleep spindles, K complexes, and vertex waves.    Activation procedures:   The patient is able to follow simple commands and answers orientation questions correctly. Photic stimulation is performed with a symmetric photic driving response noted at 10-16 flashes per second (fps).  Hyperventilation is performed with good effort with no activation of the record.     Cardiac Monitor:   Heart rate appears generally regular on a single lead EKG.    Impression:   This is an abnormal continuous EEG monitoring study because of rare left frontotemporal epileptiform appearing  discharges consistent with an area of focal cortical dysfunction/irritation and a potential seizure focus in this region.  There are no purposeful pushbutton activations and no electrographic seizures.  None of the patient's typical episodes are captured during this recording session.    LTM Summary 02/09/2023 - TBD:  Patient events/Seizures:  None  Interictal findings:  rare sporadic left frontotemporal discharges phase reversing at F7  Other notable abnormalities:  None    Lissa Reynolds MD PhD  Neurology-Epilepsy  [facility].

## 2023-02-11 NOTE — PLAN OF CARE
Problem: Adult Inpatient Plan of Care  Goal: Plan of Care Review  Outcome: Ongoing, Progressing  Flowsheets (Taken 2/11/2023 0605)  Plan of Care Reviewed With:   patient   spouse  Goal: Optimal Comfort and Wellbeing  Outcome: Ongoing, Progressing     Problem: Seizure, Active Management  Goal: Absence of Seizure/Seizure-Related Injury  Outcome: Ongoing, Progressing  Intervention: Prevent Seizure-Related Injury  Flowsheets (Taken 2/11/2023 0605)  Seizure Precautions:   activity supervised   clutter-free environment maintained   emergency equipment at bedside   side rails padded   soft boundaries provided         POC reviewed and updated with the patient/spouse. Questions regarding POC were encouraged and addressed with the patient/spouse.  VSS, see flow-sheets. Tele  maintained. Patient is AO X 4 at this time. Fall/safety precautions maintained, no signs of injury noted during shift. Seizure precautions maintained, no events noted during shift. Patient was repositioned for comfort, bed locked in low position with side rails X 4, bed alarm refused, with call light within reach. Instructed patient to call staff for mobility, verbalized understanding.  No acute signs of distress noted at this time.

## 2023-02-11 NOTE — PROCEDURES
[facility] EEG/VIDEO MONITORING REPORT  Young Joyner  [id number]  1980    DATE OF SERVICE:  02/09/2023  DATE OF ADMISSION: 2/9/2023  2:15 PM    ADMITTING/REQUESTING PROVIDER: Lissa Reynolds MD PhD    REASON FOR CONSULT:  43-year-old man with episodes of staring and generalized convulsions on brivaracetam, lamotrigine, and zonisamide admitted to the epilepsy monitoring unit for phase 1 epilepsy pre-surgical evaluation off medications.  Evaluate for evidence of epileptiform activity.    METHODOLOGY   Electroencephalographic (EEG) recording is with electrodes placed according to the International 10-20 placement system.  Thirty two (32) channels of digital signal (sampling rate of 512/sec) including T1 and T2 was simultaneously recorded from the scalp and may include  EKG, EMG, and/or eye monitors.  Recording band pass was 0.1 to 512 hz.  Digital video recording of the patient is simultaneously recorded with the EEG.  The patient is instructed report clinical symptoms which may occur during the recording session.  EEG and video recording is stored and archived in digital format.  Activation procedures which include photic stimulation, hyperventilation and instructing patients to perform simple task are done in selected patients.   The EEG is displayed on a monitor screen and can be reviewed using different montages.  Computer assisted analysis is employed to detect spike and electrographic seizure activity.   The entire record is submitted for computer analysis.  The entire recording is visually reviewed and the times identified by computer analysis as being spikes or seizures are reviewed again.  Compresses spectral analysis (CSA) is also performed on the activity recorded from each individual channel.  This is displayed as a power display of frequencies from 0 to 30 Hz over time.   The CSA is reviewed looking for asymmetries in power between homologous areas of the scalp and then compared with the  original EEG recording.     [employer] software is also utilized in the review of this study.  This software suite analyzes the EEG recording in multiple domains.  Coherence and rhythmicity is computed to identify EEG sections which may contain organized seizures.  Each channel undergoes analysis to detect presence of spike and sharp waves which have special and morphological characteristic of epileptic activity.  The routine EEG recording is converted from spacial into frequency domain.  This is then displayed comparing homologous areas to identify areas of significant asymmetry.  Algorithm to identify non-cortically generated artifact is used to separate eye movement, EMG and other artifact from the EEG.      RECORDING TIMES  Start on 02/09/2023 at 15:07 p.m.  Stop on 02/10/2023 at 07:00 a.m.  A total of 15 hours and 35 minutes of EEG recording is obtained.    EEG FINDINGS  Background activity:   The waking background is continuous and symmetric with a well-formed 10 hz posterior dominant rhythm seen bilaterally.    There are numerous pushbutton activations which appear accidental as the patient is shifting in bed with no significant changes noted on the electrographic record.    Sleep:  The patient transitions from wakefulness to sleep with the appearance of sleep spindles, K complexes, and vertex waves.    Activation procedures:   Hyperventilation is not performed  Photic stimulation is not performed    Cardiac Monitor:   Heart rate appears generally regular on a single lead EKG.    Impression:   This is a normal continuous EEG monitoring study.  There are no purposeful pushbutton activations, no epileptiform discharges, and no electrographic seizures.    LTM Summary 02/09/2023 - TBD:  Patient events/Seizures:  None  Interictal findings:  None  Other notable abnormalities:  None    Lissa Reynolds MD PhD  Neurology-Epilepsy  [facility].

## 2023-02-11 NOTE — PROGRESS NOTES
[facility] ([facility])  Neurology-Epilepsy  Progress Note    Patient Name: Young Joyner  MRN: [id number]  Admission Date: 2/9/2023  Hospital Length of Stay: 1 days  Code Status: Full Code   Attending Provider: Lissa Reynolds MD PhD  Primary Care Physician: Talon Narvaez MD   Principal Problem:Complex partial seizures evolving to generalized tonic-clonic seizures    Subjective:     Hospital Course:   2/9>2/10: NAEON; no clinical or electrographic seizures noted; decreased lamotrigine to 50 mg x 2 doses then will stop  2/10>2/11: NAEON; HV and photic performed; lamotrigine stopped; patient with left sided discharges noted on EEG, but no discrete clinical or electrographic seizures       Interval History: NAEON; lamotrigine stopped; patient with left sided discharges noted on EEG, but no discrete clinical or electrographic seizures     Review of Systems   Constitutional:  Negative for activity change, appetite change, diaphoresis and fever.   HENT:  Negative for sore throat and trouble swallowing.    Eyes:  Negative for photophobia and visual disturbance.   Respiratory:  Negative for chest tightness, shortness of breath and wheezing.    Cardiovascular:  Negative for chest pain, palpitations and leg swelling.   Gastrointestinal:  Negative for abdominal distention, abdominal pain, nausea and vomiting.   Genitourinary:  Negative for difficulty urinating and dysuria.   Skin:  Negative for color change and pallor.   Neurological:  Negative for dizziness, facial asymmetry, speech difficulty, weakness and light-headedness.   Psychiatric/Behavioral:  Negative for confusion and decreased concentration.         Anxious coming off of medications   Objective:     Vital Signs (Most Recent):  Temp: 98.7 °F (37.1 °C) (02/11/23 1109)  Pulse: 89 (02/11/23 1110)  Resp: 19 (02/11/23 1109)  BP: 112/72 (02/11/23 1109)  SpO2: 96 % (02/11/23 1109)   Vital Signs (24h Range):  Temp:  [97.4 °F (36.3 °C)-98.8 °F  (37.1 °C)] 98.7 °F (37.1 °C)  Pulse:  [55-89] 89  Resp:  [16-19] 19  SpO2:  [96 %-100 %] 96 %  BP: (112-135)/(67-85) 112/72     Weight: 116.2 kg (256 lb 2.8 oz)  Body mass index is 31.18 kg/m².    Physical Exam  Constitutional:       General: He is not in acute distress.     Appearance: He is well-developed.   HENT:      Head: Normocephalic and atraumatic.   Eyes:      Extraocular Movements: EOM normal.      Pupils: Pupils are equal, round, and reactive to light.   Cardiovascular:      Rate and Rhythm: Normal rate and regular rhythm.      Heart sounds: Normal heart sounds.   Pulmonary:      Effort: Pulmonary effort is normal. No respiratory distress.      Breath sounds: Normal breath sounds. No wheezing.   Chest:      Chest wall: No tenderness.   Abdominal:      General: Bowel sounds are normal. There is no distension.      Palpations: Abdomen is soft.      Tenderness: There is no abdominal tenderness.   Musculoskeletal:         General: Normal range of motion.      Cervical back: Neck supple.   Skin:     General: Skin is warm and dry.      Capillary Refill: Capillary refill takes less than 2 seconds.   Neurological:      Mental Status: He is alert and oriented to person, place, and time.      Cranial Nerves: No cranial nerve deficit.      Coordination: Coordination normal. Finger-Nose-Finger Test normal.      Gait: Gait is intact.      Deep Tendon Reflexes: Reflexes normal.      Reflex Scores:       Bicep reflexes are 2+ on the right side and 2+ on the left side.       Brachioradialis reflexes are 2+ on the right side and 2+ on the left side.       Patellar reflexes are 2+ on the right side and 2+ on the left side.  Psychiatric:         Thought Content: Thought content normal.       NEUROLOGICAL EXAMINATION:     MENTAL STATUS   Oriented to person, place, and time.   Level of consciousness: alert    CRANIAL NERVES     CN II   Visual fields full to confrontation.     CN III, IV, VI   Pupils are equal, round, and  reactive to light.  Extraocular motions are normal.     CN V   Facial sensation intact.     CN VII   Facial expression full, symmetric.     CN VIII   Hearing: intact    CN IX, X   CN IX normal.     CN XI   CN XI normal.     MOTOR EXAM        Strength 5/5 throughout all 4 extremities.     REFLEXES     Reflexes   Right brachioradialis: 2+  Left brachioradialis: 2+  Right biceps: 2+  Left biceps: 2+  Right patellar: 2+  Left patellar: 2+    SENSORY EXAM   Light touch normal.     GAIT AND COORDINATION     Gait  Gait: normal     Coordination   Finger to nose coordination: normal    Significant Labs: All pertinent lab results from the past 24 hours have been reviewed.    Significant Studies: I have reviewed all pertinent imaging results/findings within the past 24 hours.    Assessment and Plan:     * Complex partial seizures evolving to generalized tonic-clonic seizures  43-year-old male with past medical history of epilepsy who presents for emu admission for surgical evaluation.  His events began in 2002 and consist 2 different seizure types-GTCs and staring spells.  His GTC events occur primarily during sleep and begin with an aura of strange smells and nausea, followed by loss of consciousness, and jerking of all 4 extremities for 1-2 minutes.  He is then postictal for 5-10 minutes with confusion, difficulty talking, and sometimes somnolence. He sometimes has tongue biting and urinary incontinence. He also has staring spells, which last up to a minute, during which he is unresponsive, and sometimes proceeds to GTC events.  They are triggered by poor sleep and missed doses of medication.    2/10>2/11: NAEON; HV and photic performed; lamotrigine stopped; patient with left sided discharges noted on EEG, but no discrete clinical or electrographic seizures     Plan  - Admit to EMU for continuous vEEG  - Hold Briviact and zonisamide while inpatient for event capture  - Stopped Lamotrigine   - Lamotrigine and zonisamide  levels- pending  - UDS + for THC  - Activation procedures per protocol- HV and photic performed 2/10  - Sleep deprive overnight 2/11>2/12   - IV Ativan PRN for GTC greater than 5 min. If seizure activity continues >5 minutes after first dose, administer a second dose and call epilepsy on call.  - Seizure precautions, suction and oxygen at bedside  - Fall precautions, side rail padding in place  - Visi monitoring with continuous pulse oximetry   - Telemetry    Obstructive sleep apnea syndrome  -chronic  -previously on CPAP about 2 years ago per patient, but declines at this time          VTE Risk Mitigation (From admission, onward)         Ordered     IP VTE LOW RISK PATIENT  Once         02/09/23 1432                Rob Llanes MD  Neurology-Epilepsy  [facility] ([facility])

## 2023-02-11 NOTE — ASSESSMENT & PLAN NOTE
43-year-old male with past medical history of epilepsy who presents for emu admission for surgical evaluation.  His events began in 2002 and consist 2 different seizure types-GTCs and staring spells.  His GTC events occur primarily during sleep and begin with an aura of strange smells and nausea, followed by loss of consciousness, and jerking of all 4 extremities for 1-2 minutes.  He is then postictal for 5-10 minutes with confusion, difficulty talking, and sometimes somnolence. He sometimes has tongue biting and urinary incontinence. He also has staring spells, which last up to a minute, during which he is unresponsive, and sometimes proceeds to GTC events.  They are triggered by poor sleep and missed doses of medication.    2/10>2/11: NAEON; HV and photic performed; lamotrigine stopped; patient with left sided discharges noted on EEG, but no discrete clinical or electrographic seizures     Plan  - Admit to EMU for continuous vEEG  - Hold Briviact and zonisamide while inpatient for event capture  - Stopped Lamotrigine   - Lamotrigine and zonisamide levels- pending  - UDS + for THC  - Activation procedures per protocol- HV and photic performed 2/10  - Sleep deprive overnight 2/11>2/12   - IV Ativan PRN for GTC greater than 5 min. If seizure activity continues >5 minutes after first dose, administer a second dose and call epilepsy on call.  - Seizure precautions, suction and oxygen at bedside  - Fall precautions, side rail padding in place  - Visi monitoring with continuous pulse oximetry   - Telemetry

## 2023-02-11 NOTE — SUBJECTIVE & OBJECTIVE
Interval History: NAEON; lamotrigine stopped; patient with left sided discharges noted on EEG, but no discrete clinical or electrographic seizures     Review of Systems   Constitutional:  Negative for activity change, appetite change, diaphoresis and fever.   HENT:  Negative for sore throat and trouble swallowing.    Eyes:  Negative for photophobia and visual disturbance.   Respiratory:  Negative for chest tightness, shortness of breath and wheezing.    Cardiovascular:  Negative for chest pain, palpitations and leg swelling.   Gastrointestinal:  Negative for abdominal distention, abdominal pain, nausea and vomiting.   Genitourinary:  Negative for difficulty urinating and dysuria.   Skin:  Negative for color change and pallor.   Neurological:  Negative for dizziness, facial asymmetry, speech difficulty, weakness and light-headedness.   Psychiatric/Behavioral:  Negative for confusion and decreased concentration.         Anxious coming off of medications   Objective:     Vital Signs (Most Recent):  Temp: 98.7 °F (37.1 °C) (02/11/23 1109)  Pulse: 89 (02/11/23 1110)  Resp: 19 (02/11/23 1109)  BP: 112/72 (02/11/23 1109)  SpO2: 96 % (02/11/23 1109)   Vital Signs (24h Range):  Temp:  [97.4 °F (36.3 °C)-98.8 °F (37.1 °C)] 98.7 °F (37.1 °C)  Pulse:  [55-89] 89  Resp:  [16-19] 19  SpO2:  [96 %-100 %] 96 %  BP: (112-135)/(67-85) 112/72     Weight: 116.2 kg (256 lb 2.8 oz)  Body mass index is 31.18 kg/m².    Physical Exam  Constitutional:       General: He is not in acute distress.     Appearance: He is well-developed.   HENT:      Head: Normocephalic and atraumatic.   Eyes:      Extraocular Movements: EOM normal.      Pupils: Pupils are equal, round, and reactive to light.   Cardiovascular:      Rate and Rhythm: Normal rate and regular rhythm.      Heart sounds: Normal heart sounds.   Pulmonary:      Effort: Pulmonary effort is normal. No respiratory distress.      Breath sounds: Normal breath sounds. No wheezing.   Chest:       Chest wall: No tenderness.   Abdominal:      General: Bowel sounds are normal. There is no distension.      Palpations: Abdomen is soft.      Tenderness: There is no abdominal tenderness.   Musculoskeletal:         General: Normal range of motion.      Cervical back: Neck supple.   Skin:     General: Skin is warm and dry.      Capillary Refill: Capillary refill takes less than 2 seconds.   Neurological:      Mental Status: He is alert and oriented to person, place, and time.      Cranial Nerves: No cranial nerve deficit.      Coordination: Coordination normal. Finger-Nose-Finger Test normal.      Gait: Gait is intact.      Deep Tendon Reflexes: Reflexes normal.      Reflex Scores:       Bicep reflexes are 2+ on the right side and 2+ on the left side.       Brachioradialis reflexes are 2+ on the right side and 2+ on the left side.       Patellar reflexes are 2+ on the right side and 2+ on the left side.  Psychiatric:         Thought Content: Thought content normal.       NEUROLOGICAL EXAMINATION:     MENTAL STATUS   Oriented to person, place, and time.   Level of consciousness: alert    CRANIAL NERVES     CN II   Visual fields full to confrontation.     CN III, IV, VI   Pupils are equal, round, and reactive to light.  Extraocular motions are normal.     CN V   Facial sensation intact.     CN VII   Facial expression full, symmetric.     CN VIII   Hearing: intact    CN IX, X   CN IX normal.     CN XI   CN XI normal.     MOTOR EXAM        Strength 5/5 throughout all 4 extremities.     REFLEXES     Reflexes   Right brachioradialis: 2+  Left brachioradialis: 2+  Right biceps: 2+  Left biceps: 2+  Right patellar: 2+  Left patellar: 2+    SENSORY EXAM   Light touch normal.     GAIT AND COORDINATION     Gait  Gait: normal     Coordination   Finger to nose coordination: normal    Significant Labs: All pertinent lab results from the past 24 hours have been reviewed.    Significant Studies: I have reviewed all pertinent imaging  results/findings within the past 24 hours.

## 2023-02-11 NOTE — PROGRESS NOTES
EEG Hook up  AM Check Electrodes had to be fixed.Yes      Rajendra Santamaria   02/11/2023 5:10 PM

## 2023-02-12 ENCOUNTER — DOCUMENTATION ONLY (OUTPATIENT)
Dept: NEUROLOGY | Facility: CLINIC | Age: 43
End: 2023-02-12
Payer: MEDICAID

## 2023-02-12 PROCEDURE — 11000001 HC ACUTE MED/SURG PRIVATE ROOM

## 2023-02-12 PROCEDURE — 99233 SBSQ HOSP IP/OBS HIGH 50: CPT | Mod: ,,, | Performed by: PSYCHIATRY & NEUROLOGY

## 2023-02-12 PROCEDURE — 25000003 PHARM REV CODE 250: Performed by: PSYCHIATRY & NEUROLOGY

## 2023-02-12 PROCEDURE — 95714 VEEG EA 12-26 HR UNMNTR: CPT

## 2023-02-12 PROCEDURE — 25000003 PHARM REV CODE 250: Performed by: STUDENT IN AN ORGANIZED HEALTH CARE EDUCATION/TRAINING PROGRAM

## 2023-02-12 PROCEDURE — C9399 UNCLASSIFIED DRUGS OR BIOLOG: HCPCS | Performed by: STUDENT IN AN ORGANIZED HEALTH CARE EDUCATION/TRAINING PROGRAM

## 2023-02-12 PROCEDURE — 63600175 PHARM REV CODE 636 W HCPCS: Performed by: STUDENT IN AN ORGANIZED HEALTH CARE EDUCATION/TRAINING PROGRAM

## 2023-02-12 PROCEDURE — 99233 PR SUBSEQUENT HOSPITAL CARE,LEVL III: ICD-10-PCS | Mod: ,,, | Performed by: PSYCHIATRY & NEUROLOGY

## 2023-02-12 RX ORDER — CLOBAZAM 10 MG/1
10 TABLET ORAL NIGHTLY
Status: DISCONTINUED | OUTPATIENT
Start: 2023-02-12 | End: 2023-02-13 | Stop reason: HOSPADM

## 2023-02-12 RX ORDER — LAMOTRIGINE 150 MG/1
300 TABLET ORAL 2 TIMES DAILY
Status: DISCONTINUED | OUTPATIENT
Start: 2023-02-12 | End: 2023-02-13 | Stop reason: HOSPADM

## 2023-02-12 RX ORDER — LAMOTRIGINE 150 MG/1
300 TABLET ORAL ONCE
Status: COMPLETED | OUTPATIENT
Start: 2023-02-12 | End: 2023-02-12

## 2023-02-12 RX ORDER — LAMOTRIGINE 150 MG/1
150 TABLET ORAL 2 TIMES DAILY
Status: DISCONTINUED | OUTPATIENT
Start: 2023-02-12 | End: 2023-02-12

## 2023-02-12 RX ORDER — CLOBAZAM 10 MG/1
10 TABLET ORAL ONCE
Status: COMPLETED | OUTPATIENT
Start: 2023-02-12 | End: 2023-02-12

## 2023-02-12 RX ADMIN — LAMOTRIGINE 300 MG: 150 TABLET ORAL at 05:02

## 2023-02-12 RX ADMIN — LORAZEPAM 1 MG: 2 INJECTION INTRAMUSCULAR; INTRAVENOUS at 06:02

## 2023-02-12 RX ADMIN — ACETAMINOPHEN 650 MG: 325 TABLET ORAL at 08:02

## 2023-02-12 RX ADMIN — CLOBAZAM 10 MG: 10 TABLET ORAL at 05:02

## 2023-02-12 RX ADMIN — ONDANSETRON 8 MG: 8 TABLET, ORALLY DISINTEGRATING ORAL at 07:02

## 2023-02-12 RX ADMIN — ACETAMINOPHEN 650 MG: 325 TABLET ORAL at 12:02

## 2023-02-12 RX ADMIN — CLOBAZAM 10 MG: 10 TABLET ORAL at 08:02

## 2023-02-12 RX ADMIN — LAMOTRIGINE 300 MG: 150 TABLET ORAL at 08:02

## 2023-02-12 RX ADMIN — BRIVARACETAM 200 MG: 50 INJECTION, SUSPENSION INTRAVENOUS at 05:02

## 2023-02-12 RX ADMIN — BRIVARACETAM 100 MG: 100 TABLET, FILM COATED ORAL at 08:02

## 2023-02-12 RX ADMIN — LAMOTRIGINE 150 MG: 150 TABLET ORAL at 11:02

## 2023-02-12 NOTE — SUBJECTIVE & OBJECTIVE
Interval History: patient with 2 clinical and electrographic seizure events overnight @ 2318 on 2/11 and @ 0311 2/12, and one additional clinical seizure event @ 0645 that was not recorded on EEG as patient had inadvertently removed leads during previous event; started lamotrigine 150 mg BID    Review of Systems   Constitutional:  Negative for activity change, appetite change, diaphoresis and fever.   HENT:  Negative for sore throat and trouble swallowing.    Eyes:  Negative for photophobia and visual disturbance.   Respiratory:  Negative for chest tightness, shortness of breath and wheezing.    Cardiovascular:  Negative for chest pain, palpitations and leg swelling.   Gastrointestinal:  Negative for abdominal distention, abdominal pain, nausea and vomiting.   Genitourinary:  Negative for difficulty urinating and dysuria.   Skin:  Negative for color change and pallor.   Neurological:  Positive for seizures. Negative for dizziness, facial asymmetry, speech difficulty, weakness and light-headedness.   Psychiatric/Behavioral:  Negative for confusion and decreased concentration.    Objective:     Vital Signs (Most Recent):  Temp: 99.6 °F (37.6 °C) (02/12/23 1106)  Pulse: 77 (02/12/23 1106)  Resp: 17 (02/12/23 1106)  BP: 131/75 (02/12/23 1106)  SpO2: 97 % (02/12/23 1106)   Vital Signs (24h Range):  Temp:  [97.5 °F (36.4 °C)-99.6 °F (37.6 °C)] 99.6 °F (37.6 °C)  Pulse:  [] 77  Resp:  [14-19] 17  SpO2:  [90 %-100 %] 97 %  BP: (108-177)/() 131/75     Weight: 116.2 kg (256 lb 2.8 oz)  Body mass index is 31.18 kg/m².    Physical Exam  Constitutional:       General: He is not in acute distress.     Appearance: He is well-developed. He is obese.   HENT:      Head: Normocephalic and atraumatic.   Eyes:      Extraocular Movements: EOM normal.      Pupils: Pupils are equal, round, and reactive to light.   Cardiovascular:      Rate and Rhythm: Normal rate and regular rhythm.      Heart sounds: Normal heart sounds.    Pulmonary:      Effort: Pulmonary effort is normal. No respiratory distress.      Breath sounds: Normal breath sounds. No wheezing.   Chest:      Chest wall: No tenderness.   Abdominal:      General: Bowel sounds are normal. There is no distension.      Palpations: Abdomen is soft.      Tenderness: There is no abdominal tenderness.   Musculoskeletal:         General: Normal range of motion.      Cervical back: Neck supple.   Skin:     General: Skin is warm and dry.      Capillary Refill: Capillary refill takes less than 2 seconds.   Neurological:      Mental Status: He is oriented to person, place, and time.      Cranial Nerves: No cranial nerve deficit.      Coordination: Coordination normal. Finger-Nose-Finger Test normal.      Gait: Gait is intact.      Deep Tendon Reflexes: Reflexes normal.      Reflex Scores:       Bicep reflexes are 2+ on the right side and 2+ on the left side.       Brachioradialis reflexes are 2+ on the right side and 2+ on the left side.       Patellar reflexes are 2+ on the right side and 2+ on the left side.  Psychiatric:         Thought Content: Thought content normal.       NEUROLOGICAL EXAMINATION:     MENTAL STATUS   Oriented to person, place, and time.   Level of consciousness: drowsy    CRANIAL NERVES     CN II   Visual fields full to confrontation.     CN III, IV, VI   Pupils are equal, round, and reactive to light.  Extraocular motions are normal.     CN V   Facial sensation intact.     CN VII   Facial expression full, symmetric.     CN VIII   Hearing: intact    CN IX, X   CN IX normal.     CN XI   CN XI normal.     MOTOR EXAM        Strength 5/5 throughout all 4 extremities.     REFLEXES     Reflexes   Right brachioradialis: 2+  Left brachioradialis: 2+  Right biceps: 2+  Left biceps: 2+  Right patellar: 2+  Left patellar: 2+    SENSORY EXAM   Light touch normal.     GAIT AND COORDINATION     Gait  Gait: normal     Coordination   Finger to nose coordination:  normal    Significant Labs: All pertinent lab results from the past 24 hours have been reviewed.    Significant Studies: I have reviewed all pertinent imaging results/findings within the past 24 hours.

## 2023-02-12 NOTE — PROCEDURES
[facility] EEG/VIDEO MONITORING REPORT  Young Joyner  [id number]  1980    DATE OF SERVICE:  02/11/2023  DATE OF ADMISSION: 2/9/2023  2:15 PM    ADMITTING/REQUESTING PROVIDER: Lissa Reynolds MD PhD    REASON FOR CONSULT:  43-year-old man with episodes of staring and generalized convulsions on brivaracetam, lamotrigine, and zonisamide admitted to the epilepsy monitoring unit for phase 1 epilepsy pre-surgical evaluation off antiseizure medications.      METHODOLOGY   Electroencephalographic (EEG) recording is with electrodes placed according to the International 10-20 placement system.  Thirty two (32) channels of digital signal (sampling rate of 512/sec) including T1 and T2 was simultaneously recorded from the scalp and may include  EKG, EMG, and/or eye monitors.  Recording band pass was 0.1 to 512 hz.  Digital video recording of the patient is simultaneously recorded with the EEG.  The patient is instructed report clinical symptoms which may occur during the recording session.  EEG and video recording is stored and archived in digital format.  Activation procedures which include photic stimulation, hyperventilation and instructing patients to perform simple task are done in selected patients.   The EEG is displayed on a monitor screen and can be reviewed using different montages.  Computer assisted analysis is employed to detect spike and electrographic seizure activity.   The entire record is submitted for computer analysis.  The entire recording is visually reviewed and the times identified by computer analysis as being spikes or seizures are reviewed again.  Compresses spectral analysis (CSA) is also performed on the activity recorded from each individual channel.  This is displayed as a power display of frequencies from 0 to 30 Hz over time.   The CSA is reviewed looking for asymmetries in power between homologous areas of the scalp and then compared with the original EEG recording.     Persyst  software is also utilized in the review of this study.  This software suite analyzes the EEG recording in multiple domains.  Coherence and rhythmicity is computed to identify EEG sections which may contain organized seizures.  Each channel undergoes analysis to detect presence of spike and sharp waves which have special and morphological characteristic of epileptic activity.  The routine EEG recording is converted from spacial into frequency domain.  This is then displayed comparing homologous areas to identify areas of significant asymmetry.  Algorithm to identify non-cortically generated artifact is used to separate eye movement, EMG and other artifact from the EEG.      RECORDING TIMES  Start on 02/11/2023 at 07:00 a.m.  Stop on 02/12/2023 at 03:34 a.m. when the breakout box is disconnected from the patient accidentally  A total of 20 hours and 31 minutes of EEG recording is obtained.    EEG FINDINGS  Background activity:   The waking background is continuous and symmetric with a well-formed 10 hz posterior dominant rhythm seen bilaterally.  There are rare sporadic left frontotemporal discharges phase reversing at F7.    There are pushbutton activations for electroclinical seizures at 23:18 p.m. and 03:10 a.m. Of note, there is a third clinical seizure at 06:45 a.m. but the breakout box is disconnected at this time point so there is no EEG or video associated with this event.  Both seizures captured are similar.  Clinically, before the events, the patient is lying in bed quietly with his eyes closed.  25 seconds into the seizure, the patient begins to have prominent oral automatisms.  Approximately a minute into the seizure, he has a prominent right head/eye version, ictal grunt, vigorous asynchronous myoclonic jerking of the arms, head, trunk, and extremities which gradually taper off.  Electrographically, from the seizure onset, there is the gradual emergence of monomorphic theta activity from the left temporal  leads which becomes more organized delta activity that gradually spreads anteriorly and bilaterally before transitioning into continuous spike wave activity most prominent over the left temporal leads.  The background is then abruptly obscured by dense artifact when the seizure secondarily generalizes.  After the seizure, there is disorganized slowing of the background which gradually returns back to the patient's baseline.    Sleep:  The patient transitions from wakefulness to sleep with the appearance of sleep spindles, K complexes, and vertex waves.    Activation procedures:   The patient is able to follow simple commands and answers orientation questions correctly. Photic stimulation is performed with a symmetric photic driving response noted at 10-16 flashes per second (fps).  Hyperventilation is performed with good effort with no activation of the record.     Cardiac Monitor:   Heart rate appears generally regular on a single lead EKG.    Impression:   This is an abnormal continuous EEG monitoring study because of two electroclinical seizures which appear to arise from the left frontotemporal region associated with oral automatisms and a prominent right head/eye version which then secondarily generalize.  There are rare left frontotemporal epileptiform appearing discharges consistent with an area of focal cortical dysfunction/irritation and a potential seizure focus in this region.      LTM Summary 02/09/2023 - TBD:  Patient events/Seizures:    There are two similar electroclinical seizures on 02/11/2023 at 23:18 p.m. and 02/12/2023 at 03:10 a.m. Of note, there is a third clinical seizure on 02/12/2023 at 06:45 a.m. but the breakout box is disconnected at this time point so there is no EEG or video associated with the third event.  Clinically, before the events, the patient is lying in bed quietly with his eyes closed.  25 seconds into the seizure, the patient begins to have prominent oral automatisms.   Approximately a minute into the seizure, he has a prominent right head/eye version, ictal grunt, vigorous asynchronous myoclonic jerking of the arms, head, trunk, and extremities which gradually taper off.  Electrographically, from the seizure onset, there is the gradual emergence of monomorphic theta activity from the left temporal leads which becomes more organized delta activity that gradually spreads anteriorly and bilaterally before transitioning into continuous spike wave activity most prominent over the left temporal leads.  The background is then abruptly obscured by dense artifact when the seizure secondarily generalizes.  After the seizure, there is disorganized slowing of the background which gradually returns back to the patient's baseline.  Interictal findings:  rare sporadic left frontotemporal discharges phase reversing at F7  Other notable abnormalities:  None    Lissa Reynolds MD PhD  Neurology-Epilepsy  [facility].

## 2023-02-12 NOTE — PROGRESS NOTES
[facility] - Neurosurgery ([facility])  Neurology-Epilepsy  Progress Note    Patient Name: Young Joyner  MRN: [id number]  Admission Date: 2/9/2023  Hospital Length of Stay: 2 days  Code Status: Full Code   Attending Provider: Lissa Reynolds MD PhD  Primary Care Physician: Talon Narvaez MD   Principal Problem:Complex partial seizures evolving to generalized tonic-clonic seizures    Subjective:     Hospital Course:   2/9>2/10: NAEON; no clinical or electrographic seizures noted; decreased lamotrigine to 50 mg x 2 doses then will stop  2/10>2/11: NAEON; HV and photic performed; lamotrigine stopped; patient with left sided discharges noted on EEG, but no discrete clinical or electrographic seizures   2/11>2/12: patient with 2 clinical and electrographic seizure events overnight @ 2318 on 2/11 and @ 0311 2/12, and one additional clinical seizure event @ 0645 that was not recorded on EEG as patient had inadvertently removed leads during previous event; started lamotrigine 150 mg BID, and plan to restart full home AED regimen AM of 2/13 or if 1 more event is captured; patient back to baseline      Interval History: patient with 2 clinical and electrographic seizure events overnight @ 2318 on 2/11 and @ 0311 2/12, and one additional clinical seizure event @ 0645 that was not recorded on EEG as patient had inadvertently removed leads during previous event; started lamotrigine 150 mg BID    Review of Systems   Constitutional:  Negative for activity change, appetite change, diaphoresis and fever.   HENT:  Negative for sore throat and trouble swallowing.    Eyes:  Negative for photophobia and visual disturbance.   Respiratory:  Negative for chest tightness, shortness of breath and wheezing.    Cardiovascular:  Negative for chest pain, palpitations and leg swelling.   Gastrointestinal:  Negative for abdominal distention, abdominal pain, nausea and vomiting.   Genitourinary:  Negative for difficulty urinating and  dysuria.   Skin:  Negative for color change and pallor.   Neurological:  Positive for seizures. Negative for dizziness, facial asymmetry, speech difficulty, weakness and light-headedness.   Psychiatric/Behavioral:  Negative for confusion and decreased concentration.    Objective:     Vital Signs (Most Recent):  Temp: 99.6 °F (37.6 °C) (02/12/23 1106)  Pulse: 77 (02/12/23 1106)  Resp: 17 (02/12/23 1106)  BP: 131/75 (02/12/23 1106)  SpO2: 97 % (02/12/23 1106)   Vital Signs (24h Range):  Temp:  [97.5 °F (36.4 °C)-99.6 °F (37.6 °C)] 99.6 °F (37.6 °C)  Pulse:  [] 77  Resp:  [14-19] 17  SpO2:  [90 %-100 %] 97 %  BP: (108-177)/() 131/75     Weight: 116.2 kg (256 lb 2.8 oz)  Body mass index is 31.18 kg/m².    Physical Exam  Constitutional:       General: He is not in acute distress.     Appearance: He is well-developed. He is obese.   HENT:      Head: Normocephalic and atraumatic.   Eyes:      Extraocular Movements: EOM normal.      Pupils: Pupils are equal, round, and reactive to light.   Cardiovascular:      Rate and Rhythm: Normal rate and regular rhythm.      Heart sounds: Normal heart sounds.   Pulmonary:      Effort: Pulmonary effort is normal. No respiratory distress.      Breath sounds: Normal breath sounds. No wheezing.   Chest:      Chest wall: No tenderness.   Abdominal:      General: Bowel sounds are normal. There is no distension.      Palpations: Abdomen is soft.      Tenderness: There is no abdominal tenderness.   Musculoskeletal:         General: Normal range of motion.      Cervical back: Neck supple.   Skin:     General: Skin is warm and dry.      Capillary Refill: Capillary refill takes less than 2 seconds.   Neurological:      Mental Status: He is oriented to person, place, and time.      Cranial Nerves: No cranial nerve deficit.      Coordination: Coordination normal. Finger-Nose-Finger Test normal.      Gait: Gait is intact.      Deep Tendon Reflexes: Reflexes normal.      Reflex Scores:        Bicep reflexes are 2+ on the right side and 2+ on the left side.       Brachioradialis reflexes are 2+ on the right side and 2+ on the left side.       Patellar reflexes are 2+ on the right side and 2+ on the left side.  Psychiatric:         Thought Content: Thought content normal.       NEUROLOGICAL EXAMINATION:     MENTAL STATUS   Oriented to person, place, and time.   Level of consciousness: drowsy    CRANIAL NERVES     CN II   Visual fields full to confrontation.     CN III, IV, VI   Pupils are equal, round, and reactive to light.  Extraocular motions are normal.     CN V   Facial sensation intact.     CN VII   Facial expression full, symmetric.     CN VIII   Hearing: intact    CN IX, X   CN IX normal.     CN XI   CN XI normal.     MOTOR EXAM        Strength 5/5 throughout all 4 extremities.     REFLEXES     Reflexes   Right brachioradialis: 2+  Left brachioradialis: 2+  Right biceps: 2+  Left biceps: 2+  Right patellar: 2+  Left patellar: 2+    SENSORY EXAM   Light touch normal.     GAIT AND COORDINATION     Gait  Gait: normal     Coordination   Finger to nose coordination: normal    Significant Labs: All pertinent lab results from the past 24 hours have been reviewed.    Significant Studies: I have reviewed all pertinent imaging results/findings within the past 24 hours.    Assessment and Plan:     * Complex partial seizures evolving to generalized tonic-clonic seizures  43-year-old male with past medical history of epilepsy who presents for emu admission for surgical evaluation.  His events began in 2002 and consist 2 different seizure types-GTCs and staring spells.  His GTC events occur primarily during sleep and begin with an aura of strange smells and nausea, followed by loss of consciousness, and jerking of all 4 extremities for 1-2 minutes.  He is then postictal for 5-10 minutes with confusion, difficulty talking, and sometimes somnolence. He sometimes has tongue biting and urinary incontinence. He  also has staring spells, which last up to a minute, during which he is unresponsive, and sometimes proceeds to GTC events.  They are triggered by poor sleep and missed doses of medication.    2/10>2/11: NAEON; HV and photic performed; lamotrigine stopped; patient with left sided discharges noted on EEG, but no discrete clinical or electrographic seizures   2/11>2/12: 2/11>2/12: patient with 2 clinical and electrographic seizure events overnight @ 2318 on 2/11 and @ 0311 2/12, and one additional clinical seizure event @ 0645 that was not recorded on EEG as patient had inadvertently removed leads during previous event    Plan  - Admitted to EMU for continuous vEEG  - Hold Briviact and zonisamide while inpatient for event capture  - Restarted Lamotrigine @ 150 mg BID  - Plan to restart full home AED regimen AM of 2/13 or if 1 more event is captured  - Lamotrigine level pending  - Zonisamide level 11  - UDS + for THC  - Activation procedures per protocol- HV and photic performed 2/10  - Sleep deprived overnight 2/11>2/12   - IV Ativan PRN for GTC greater than 5 min. If seizure activity continues >5 minutes after first dose, administer a second dose and call epilepsy on call.  - Seizure precautions, suction and oxygen at bedside  - Fall precautions, side rail padding in place  - Visi monitoring with continuous pulse oximetry   - Telemetry    Obstructive sleep apnea syndrome  -Chronic  -Previously on CPAP about 2 years ago per patient, but declines at this time          VTE Risk Mitigation (From admission, onward)         Ordered     IP VTE LOW RISK PATIENT  Once         02/09/23 6872                Rob Llanes MD  Neurology-Epilepsy  [facility] ([facility])

## 2023-02-12 NOTE — PLAN OF CARE
Problem: Adult Inpatient Plan of Care  Goal: Plan of Care Review  Outcome: Ongoing, Progressing  Flowsheets (Taken 2/12/2023 0531)  Plan of Care Reviewed With:   patient   spouse  Goal: Optimal Comfort and Wellbeing  Outcome: Ongoing, Progressing     Problem: Seizure, Active Management  Goal: Absence of Seizure/Seizure-Related Injury  Outcome: Ongoing, Progressing  Intervention: Prevent Seizure-Related Injury  Flowsheets (Taken 2/12/2023 0531)  Aspiration Prevention During Seizure:   positioned on side during seizure   suctioned secretions/vomitus  Seizure Precautions:   activity supervised   clutter-free environment maintained   emergency equipment at bedside   side rails padded   soft boundaries provided           POC reviewed and updated with the patient/spouse. Questions regarding POC were encouraged and addressed with the patient/spouse.  VSS, see flow-sheets. Tele and VISI maintained. Patient is AO X 4 at this time. Fall/safety precautions maintained, no signs of injury noted during shift. Seizure precautions maintained, multiple events noted during shift. See event note for details. Patient was repositioned for comfort, bed locked in low position with side rails X 4, bed alarm set, with call light within reach. Instructed patient to call staff for mobility, verbalized understanding.  No acute signs of distress noted at this time.

## 2023-02-12 NOTE — ASSESSMENT & PLAN NOTE
43-year-old male with past medical history of epilepsy who presents for emu admission for surgical evaluation.  His events began in 2002 and consist 2 different seizure types-GTCs and staring spells.  His GTC events occur primarily during sleep and begin with an aura of strange smells and nausea, followed by loss of consciousness, and jerking of all 4 extremities for 1-2 minutes.  He is then postictal for 5-10 minutes with confusion, difficulty talking, and sometimes somnolence. He sometimes has tongue biting and urinary incontinence. He also has staring spells, which last up to a minute, during which he is unresponsive, and sometimes proceeds to GTC events.  They are triggered by poor sleep and missed doses of medication.    2/10>2/11: NAEON; HV and photic performed; lamotrigine stopped; patient with left sided discharges noted on EEG, but no discrete clinical or electrographic seizures   2/11>2/12: 2/11>2/12: patient with 2 clinical and electrographic seizure events overnight @ 2318 on 2/11 and @ 0311 2/12, and one additional clinical seizure event @ 0645 that was not recorded on EEG as patient had inadvertently removed leads during previous event    Plan  - Admitted to EMU for continuous vEEG  - Hold Briviact and zonisamide while inpatient for event capture  - Restarted Lamotrigine @ 150 mg BID  - Plan to restart full home AED regimen AM of 2/13 or if 1 more event is captured  - Lamotrigine level pending  - Zonisamide level 11  - UDS + for THC  - Activation procedures per protocol- HV and photic performed 2/10  - Sleep deprived overnight 2/11>2/12   - IV Ativan PRN for GTC greater than 5 min. If seizure activity continues >5 minutes after first dose, administer a second dose and call epilepsy on call.  - Seizure precautions, suction and oxygen at bedside  - Fall precautions, side rail padding in place  - Visi monitoring with continuous pulse oximetry   - Telemetry

## 2023-02-12 NOTE — PLAN OF CARE
Problem: Seizure, Active Management  Goal: Absence of Seizure/Seizure-Related Injury  Outcome: Ongoing, Progressing     Problem: Adult Inpatient Plan of Care  Goal: Optimal Comfort and Wellbeing  Outcome: Ongoing, Progressing     POC reviewed with the patient and the family , they verbalized understanding.Phototherapy done , tolerated vss . All comments and concerns addressed. Bed locked in lowest position with bed alarm set, call light within reach. Safety precautions maintained. VSS, see flowsheets. No events this shift. Will continue to monitor for changes to POC and clinical condition.

## 2023-02-12 NOTE — PLAN OF CARE
Problem: Seizure, Active Management  Goal: Absence of Seizure/Seizure-Related Injury  Outcome: Ongoing, Progressing     Problem: Adult Inpatient Plan of Care  Goal: Optimal Comfort and Wellbeing  Outcome: Ongoing, Progressing     POC reviewed with the patient and they verbalized understanding. All comments and concerns addressed. Bed locked in lowest position with bed alarm set, call light within reach. Safety precautions maintained. VSS, see flowsheets. No events this shift. Will continue to monitor for changes to POC and clinical condition.

## 2023-02-12 NOTE — NURSING
EMU Event Note:     Event time 2318-    At 2318, emu monitor tech alerted that patient event button was activated and patient was seizing. Ashlee WHITLOCK responded to event alarm for bedside nurse. Patient's event presented with all 4 extremities and full body shaking. Repetitive left arm raises also noted. Event lasted approximately 4 minutes. VSS during and post event, see flowsheets for details. Postictal- patient was very restless, diaphoretic, confused with  c/o nausea and HA. Nausea treated with PRN Zofran and HA treated with PRN Tylenol. VISI placed on patient post seizure and all leads on tele replaced due to the patient being diaphoretic. PIV access lost during seizure, new access placed. Seizure precautions and safe environment maintained, no signs of injury noted. VS and neuro status monitored per protocol. Re-oriented patient to healthcare setting.  Patient Aox4, following commands, and neurologically back to baseline. Patient repositioned for comfort with bed locked in low position, SR up x 4, bed alarm set, and call light within reach. Instructed patient to call staff for assistance, verbalized understanding. Instructed  the patient/his wife to activate event button if patient experiences any further auras or events, both verbalized understanding. No acute signs of distress noted at this time.  Avis WHITLOCK OC on the unit notified of event.       Event time 0311-    Alerted by emu monitor tech that patient appeared to be seizing. Upon arrival to the patient's bedside, full body jerking/convulsions noted. Event presented similar to previous event, see above noted. Associated tongue biting noted. O2 saturation maintained 94% or greater on RA. Unable to obtained BP during event or while postictal. When attempting to obtain BP, patient grew more restless and irritable. Postictal- patient presented same as prior seizure. Dr. Davila with epilepsy on call notified of event. No ativan given. Seizure precautions and safe  environment maintained, no signs of injury noted. VS and neuro status monitored per protocol. Secretions suctioned and O2 at 2L applied for comfort. Re-oriented patient to healthcare setting.  Patient Aox4, following commands, and neurologically back to baseline. Patient repositioned for comfort with bed locked in low position, SR up x 4, bed alarm set, and call light within reach. Instructed patient to call staff for assistance, verbalized understanding. Instructed  the patient/his wife to activate event button if patient experiences any further auras or events, both verbalized understanding. No acute signs of distress noted at this time. Once patient was less restless, VISI re-calibrated and BP WDL, see flowsheets.  Avis WHITLOCK OC on the unit notified of event.

## 2023-02-12 NOTE — NURSING
EMU EVENT NOTE:        Event time 0642-    Monitor tech called at out at 0642 that patient was having a seizure. Upon arrival at the bedside, patient had full body jerking/shaking. Seizure presentation like previous 2 events. Associated tongue biting noted. O2 saturation maintained 94% or greater on RA. Unable to obtained BP during event or while postictal. When attempting to obtain BP, patient grew more restless and irritable. Postictal- patient presented same as prior seizure. Dr. Davila with epilepsy on call notified of event. Orders for 1mg of ativan IVP, see MAR for details. Seizure precautions and safe environment maintained, no signs of injury noted. VS and neuro status monitored per protocol. Secretions suctioned and O2 at 2L applied for comfort. Re-oriented patient to healthcare setting.  Patient repositioned for comfort with bed locked in low position, SR up x 4, bed alarm set, and call light within reach. Instructed patient to call staff for assistance, verbalized understanding. Instructed  the patient/his wife to activate event button if patient experiences any further auras or events, both verbalized understanding. Patient still remains very restless after Ativan. CHINYERE Almanzar OC on the unit notified of event.

## 2023-02-13 ENCOUNTER — TELEPHONE (OUTPATIENT)
Dept: NEUROLOGY | Facility: CLINIC | Age: 43
End: 2023-02-13
Payer: MEDICAID

## 2023-02-13 VITALS
OXYGEN SATURATION: 99 % | BODY MASS INDEX: 31.2 KG/M2 | HEART RATE: 67 BPM | TEMPERATURE: 99 F | SYSTOLIC BLOOD PRESSURE: 116 MMHG | HEIGHT: 76 IN | WEIGHT: 256.19 LBS | DIASTOLIC BLOOD PRESSURE: 73 MMHG | RESPIRATION RATE: 16 BRPM

## 2023-02-13 DIAGNOSIS — G40.209 COMPLEX PARTIAL SEIZURES EVOLVING TO GENERALIZED TONIC-CLONIC SEIZURES: Primary | ICD-10-CM

## 2023-02-13 LAB — LAMOTRIGINE SERPL-MCNC: 7 UG/ML (ref 2–15)

## 2023-02-13 PROCEDURE — 99233 SBSQ HOSP IP/OBS HIGH 50: CPT | Mod: ,,, | Performed by: PSYCHIATRY & NEUROLOGY

## 2023-02-13 PROCEDURE — 99233 PR SUBSEQUENT HOSPITAL CARE,LEVL III: ICD-10-PCS | Mod: ,,, | Performed by: PSYCHIATRY & NEUROLOGY

## 2023-02-13 PROCEDURE — 25000003 PHARM REV CODE 250: Performed by: STUDENT IN AN ORGANIZED HEALTH CARE EDUCATION/TRAINING PROGRAM

## 2023-02-13 RX ORDER — CLOBAZAM 10 MG/1
10 TABLET ORAL DAILY
Qty: 30 EACH | Refills: 5 | OUTPATIENT
Start: 2023-02-13 | End: 2023-02-13

## 2023-02-13 RX ORDER — CLOBAZAM 10 MG/1
10 TABLET ORAL NIGHTLY
Qty: 30 EACH | Refills: 5 | Status: SHIPPED | OUTPATIENT
Start: 2023-02-13 | End: 2023-03-17 | Stop reason: SDUPTHER

## 2023-02-13 RX ADMIN — BRIVARACETAM 100 MG: 100 TABLET, FILM COATED ORAL at 09:02

## 2023-02-13 RX ADMIN — LAMOTRIGINE 300 MG: 150 TABLET ORAL at 09:02

## 2023-02-13 NOTE — PLAN OF CARE
Problem: Adult Inpatient Plan of Care  Goal: Plan of Care Review  Outcome: Met  Goal: Patient-Specific Goal (Individualized)  Outcome: Met  Goal: Absence of Hospital-Acquired Illness or Injury  Outcome: Met  Goal: Optimal Comfort and Wellbeing  Outcome: Met  Goal: Readiness for Transition of Care  Outcome: Met     Problem: Seizure, Active Management  Goal: Absence of Seizure/Seizure-Related Injury  Outcome: Met

## 2023-02-13 NOTE — PLAN OF CARE
Problem: Adult Inpatient Plan of Care  Goal: Plan of Care Review  Outcome: Ongoing, Progressing  Flowsheets (Taken 2/13/2023 0211)  Plan of Care Reviewed With:   patient   spouse  Goal: Optimal Comfort and Wellbeing  Outcome: Ongoing, Progressing  Goal: Readiness for Transition of Care  Outcome: Ongoing, Progressing     Problem: Seizure, Active Management  Goal: Absence of Seizure/Seizure-Related Injury  Outcome: Ongoing, Progressing  Intervention: Prevent Seizure-Related Injury  Flowsheets (Taken 2/13/2023 0211)  Seizure Precautions:   activity supervised   clutter-free environment maintained   emergency equipment at bedside   side rails padded   soft boundaries provided       POC reviewed and updated with the patient/spouse. Questions regarding POC were encouraged and addressed with the patient/spouse.  VSS, see flow-sheets. Tele maintained. Patient is AO X 4 at this time. Fall/safety precautions maintained, no signs of injury noted during shift. Seizure precautions maintained, no events noted during shift. All AEDs have been resumed. Patient was repositioned for comfort, bed locked in low position with side rails X 4, bed alarm refused, with call light within reach. Instructed patient to call staff for mobility, verbalized understanding.  No acute signs of distress noted at this time.

## 2023-02-13 NOTE — PLAN OF CARE
[facility] - Neurosurgery (Hospital)  Discharge Final Note    Primary Care Provider: Talon Narvaez MD  Expected Discharge Date: 2/13/2023    Patient medically ready for discharge to home.   Family will bring patient home.   Is family/patient aware of discharge: Yes   Hospital follow up scheduled: Yes       Final Discharge Note (most recent)       Final Note - 02/13/23 1543          Final Note    Assessment Type Final Discharge Note     Anticipated Discharge Disposition Home or Self Care     Hospital Resources/Appts/Education Provided Provided patient/caregiver with written discharge plan information                     Important Message from Medicare         Referral Info (most recent)       Referral Info    No documentation.                 Contact Info       [facility]    [address] [place] [address]    Phone: [phone]. [phone]       Next Steps: Go on 2/22/2023    Instructions: Hospital follow up 2/22 at 11a          Future Appointments   Date Time Provider Department Center   3/11/2023 12:30 PM [facility]     [place] RN  Case Management  Ext: [phone]  02/13/2023

## 2023-02-13 NOTE — PLAN OF CARE
SSC met with patient/family at bedside. Patient experience rounding completed and reviewed the following.     Do you know your discharge plan? Yes     If yes, what is the plan? Home    If you are discharging home, do you have help at home? Yes    Do you think you will need help at home at discharge? Yes     Have you discussed your needs and preferences with your SW/CM? Yes     Assigned SW/CM notified of any patient/family needs or concerns.

## 2023-02-13 NOTE — ASSESSMENT & PLAN NOTE
43-year-old male with past medical history of epilepsy who presents for emu admission for surgical evaluation.  His events began in 2002 and consist 2 different seizure types-GTCs and staring spells.  His GTC events occur primarily during sleep and begin with an aura of strange smells and nausea, followed by loss of consciousness, and jerking of all 4 extremities for 1-2 minutes.  He is then postictal for 5-10 minutes with confusion, difficulty talking, and sometimes somnolence. He sometimes has tongue biting and urinary incontinence. He also has staring spells, which last up to a minute, during which he is unresponsive, and sometimes proceeds to GTC events.  They are triggered by poor sleep and missed doses of medication.    2/11>2/12: 2/11>2/12: patient with 2 clinical and electrographic seizure events overnight @ 2318 on 2/11 and @ 0311 2/12, and one additional clinical seizure event @ 0645 that was not recorded on EEG as patient had inadvertently removed leads during previous event  2/12>2/13: patient with 3 additional electrographic and clinical seizure events, with semiology similar to priors (one event w/ less severe clinical symptoms but view obstructed by bedding) and electrographically beginning in the left frontotemporal region associated with oral automatisms and a prominent right head/eye version which then secondarily generalize; 5 total events captured during this admission.    Plan  - Admitted to EMU for continuous vEEG  - Expected discharge today (2/13)  - Hold Briviact and zonisamide while inpatient for event capture  - Discharge AED regimen- Brivaracetam 100 mg BID, lamotrigine 300 mg BID, and clobazam 10 mg qhs  - MRI ordered and neuropsychology referral placed outpatient to continue surgical workup  - On admission, Lamotrigine level 7 and Zonisamide level 11  - UDS + for THC  - F/u outpatient with Jessica Sheldon and Dr. Kenney  - Discussed with pt/family regarding driving laws in [place] after  seizures.  Pt must refrain from driving for 6 months after having a seizure or seizure-like event before can legally resume driving.  Physician team not required to report pt to DMV.  Informed pt that I would document this conversation in the medical record.  Additionally, advised pt to avoid bathing alone, working in high places, and to not supervise children swimming alone or engage in other activities where losing consciousness or motor control would risk harm to self or others.

## 2023-02-20 ENCOUNTER — TELEPHONE (OUTPATIENT)
Dept: NEUROLOGY | Facility: CLINIC | Age: 43
End: 2023-02-20
Payer: MEDICAID

## 2023-03-10 ENCOUNTER — TELEPHONE (OUTPATIENT)
Dept: NEUROLOGY | Facility: CLINIC | Age: 43
End: 2023-03-10
Payer: MEDICAID

## 2023-03-10 ENCOUNTER — PATIENT MESSAGE (OUTPATIENT)
Dept: NEUROLOGY | Facility: CLINIC | Age: 43
End: 2023-03-10
Payer: MEDICAID

## 2023-03-10 DIAGNOSIS — G40.209 COMPLEX PARTIAL SEIZURES EVOLVING TO GENERALIZED TONIC-CLONIC SEIZURES: Primary | ICD-10-CM

## 2023-03-10 NOTE — TELEPHONE ENCOUNTER
----- Message from Jessica Sheldon PA-C sent at 3/10/2023  8:58 AM CST -----  Please schedule for next available follow up. Thank you!    Denise

## 2023-03-10 NOTE — TELEPHONE ENCOUNTER
Called and informed patient his MRI was approved. Approval #[id number].     Patient expresses over the phone today some concerns/life stressors, particularly relating to his epilepsy. Will place social work referral for some additional support. Prescription for seizure watch. Will also schedule for follow up. Asked patient to inquire from his dentist what they need from neurology to clear him for dental work and to let us know. Patient is comfortable with plan. All questions and concerns are addressed at this time.     Jessica Sheldon PA-C   Neurology-Epilepsy  [facility]       ----- Message from Zoie Delacruz sent at 3/10/2023  8:26 AM [facility] -----  Contact: payal @ [phone]  Young Joyner calling regarding Patient Advice (message) for #pt is calling about an appeal needed to be sent to his insurance company. Asking for call back

## 2023-03-11 ENCOUNTER — HOSPITAL ENCOUNTER (OUTPATIENT)
Dept: RADIOLOGY | Facility: HOSPITAL | Age: 43
Discharge: HOME | End: 2023-03-11
Payer: MEDICAID

## 2023-03-11 DIAGNOSIS — G40.209 COMPLEX PARTIAL SEIZURES EVOLVING TO GENERALIZED TONIC-CLONIC SEIZURES: ICD-10-CM

## 2023-03-11 PROCEDURE — 70551 MRI BRAIN STEM W/O DYE: CPT | Mod: 26,,, | Performed by: RADIOLOGY

## 2023-03-11 PROCEDURE — 70551 MRI BRAIN EPILEPSY WITHOUT CONTRAST: ICD-10-PCS | Mod: 26,,, | Performed by: RADIOLOGY

## 2023-03-11 PROCEDURE — 70551 MRI BRAIN STEM W/O DYE: CPT | Mod: TC

## 2023-03-16 ENCOUNTER — PATIENT MESSAGE (OUTPATIENT)
Dept: NEUROLOGY | Facility: CLINIC | Age: 43
End: 2023-03-16
Payer: MEDICAID

## 2023-03-17 ENCOUNTER — PATIENT MESSAGE (OUTPATIENT)
Dept: NEUROLOGY | Facility: CLINIC | Age: 43
End: 2023-03-17
Payer: MEDICAID

## 2023-03-17 DIAGNOSIS — G40.209 COMPLEX PARTIAL SEIZURES EVOLVING TO GENERALIZED TONIC-CLONIC SEIZURES: Primary | ICD-10-CM

## 2023-03-17 RX ORDER — CLOBAZAM 10 MG/1
10 TABLET ORAL NIGHTLY
Qty: 30 EACH | Refills: 5 | Status: SHIPPED | OUTPATIENT
Start: 2023-03-17 | End: 2023-09-01 | Stop reason: SDUPTHER

## 2023-03-17 NOTE — TELEPHONE ENCOUNTER
cloBAZam (ONFI) 10 mg Tab     Preferred Pharmacy with phone number: [facility]/pharmacy #[id number] - [place] [address]   Phone: [phone]   Fax: [phone]       Communication Preference: [phone] (home)     Additional Information: Pt stated he have been waiting for his prescription to be send to the pharmacy. Pt is completely out of his medication and the pharmacy close for 4:30p today Pt ask for a call back as soon as possible today please

## 2023-03-23 ENCOUNTER — OFFICE VISIT (OUTPATIENT)
Dept: NEUROLOGY | Facility: CLINIC | Age: 43
End: 2023-03-23
Payer: MEDICAID

## 2023-03-23 DIAGNOSIS — F32.A DEPRESSION, UNSPECIFIED DEPRESSION TYPE: Primary | ICD-10-CM

## 2023-03-23 DIAGNOSIS — G40.209 COMPLEX PARTIAL SEIZURES EVOLVING TO GENERALIZED TONIC-CLONIC SEIZURES: ICD-10-CM

## 2023-03-23 PROCEDURE — 90791 PSYCH DIAGNOSTIC EVALUATION: CPT | Mod: 95,AJ,HB, | Performed by: SOCIAL WORKER

## 2023-03-23 PROCEDURE — 90791 PR PSYCHIATRIC DIAGNOSTIC EVALUATION: ICD-10-PCS | Mod: 95,AJ,HB, | Performed by: SOCIAL WORKER
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
## 2023-03-29 ENCOUNTER — PATIENT MESSAGE (OUTPATIENT)
Dept: NEUROLOGY | Facility: CLINIC | Age: 43
End: 2023-03-29

## 2023-04-03 ENCOUNTER — PATIENT MESSAGE (OUTPATIENT)
Dept: NEUROLOGY | Facility: CLINIC | Age: 43
End: 2023-04-03
Payer: MEDICAID

## 2023-04-03 ENCOUNTER — TELEPHONE (OUTPATIENT)
Dept: NEUROLOGY | Facility: CLINIC | Age: 43
End: 2023-04-03
Payer: MEDICAID

## 2023-04-03 DIAGNOSIS — G40.209 COMPLEX PARTIAL SEIZURES EVOLVING TO GENERALIZED TONIC-CLONIC SEIZURES: ICD-10-CM

## 2023-04-03 RX ORDER — LAMOTRIGINE 150 MG/1
TABLET ORAL
Qty: 360 TABLET | Refills: 3 | OUTPATIENT
Start: 2023-04-03

## 2023-04-03 RX ORDER — LAMOTRIGINE 150 MG/1
300 TABLET ORAL 2 TIMES DAILY
Qty: 120 TABLET | Refills: 11 | Status: SHIPPED | OUTPATIENT
Start: 2023-04-03 | End: 2023-12-30

## 2023-04-10 ENCOUNTER — OFFICE VISIT (OUTPATIENT)
Dept: NEUROLOGY | Facility: CLINIC | Age: 43
End: 2023-04-10
Payer: MEDICAID

## 2023-04-10 DIAGNOSIS — K08.9 DENTAL DISEASE: ICD-10-CM

## 2023-04-10 DIAGNOSIS — F39 MOOD DISORDER: ICD-10-CM

## 2023-04-10 DIAGNOSIS — G40.209 COMPLEX PARTIAL SEIZURES EVOLVING TO GENERALIZED TONIC-CLONIC SEIZURES: Primary | ICD-10-CM

## 2023-04-10 PROCEDURE — 99215 OFFICE O/P EST HI 40 MIN: CPT | Mod: 95,,,

## 2023-04-10 PROCEDURE — 99215 PR OFFICE/OUTPT VISIT, EST, LEVL V, 40-54 MIN: ICD-10-PCS | Mod: 95,,,

## 2023-04-10 RX ORDER — SERTRALINE HYDROCHLORIDE 50 MG/1
50 TABLET, FILM COATED ORAL DAILY
Qty: 90 TABLET | Refills: 3 | Status: SHIPPED | OUTPATIENT
Start: 2023-04-10 | End: 2024-04-09
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
## 2023-04-10 NOTE — PATIENT INSTRUCTIONS
You came to Epilepsy Clinic because of your seizure disorder. Your seizures are currently controlled on clobazam 10mg nightly, briviact 100mg twice daily, and lamotrigine 300mg twice daily.  Please continue the same medications at the same dose.     Do not miss any doses of medication. If a dose of medication is missed, take it as soon as it is remembered even if that means doubling up on the dose. Get regular sleep. Go to sleep at the same time and wake up at the same time every day. People with epilepsy require more sleep than people without epilepsy.  Sleeping 10-12 hours a day can be normal for a person with epilepsy.  Every seizure makes it harder to prevent the next seizure. Epilepsy is associated with SUDEP, or sudden unexpected death in epilepsy.  The risk is significantly higher if convulsive seizures are not well controlled. For more information, check out these websites: https://www.epilepsy.com/, https://www.epilepsyallianceamerica.org/, www.romeo-epilepsy.org, www.womenandepilepsy.org.      You are anxious. I would like you to try a new medication called sertraline. Please take 1/2 tablet for 14 days then increase to 1 tablet daily. You can take this medication in the morning or at night, with or without food. You will have some side effects as you start this medication like GI upset, changes in appetite, changes in sleep, possibly more anxiety. Please push through, your body will adjust.  Please do a self inventory in two months to see how you are doing with the medication and give me an update through the portal at that time.     Per [place] law, no episodes of loss of consciousness for 6 months before driving.  Avoid dangerous situations.  For example, no baths/pools alone, no heights, no power tools.  Wear a bike helmet.  If breakthrough seizures occur that are different in character, frequency, or duration from normal episodes, please patient portal me or call the office and we will decide the next  steps. If multiple seizures occur in a row without return back to baseline, 911 needs to be called.     Return to clinic in 3 months or sooner with issues.  Please patient portal with any questions or concerns.    Jessica Sheldon PA-C   Neurology-Epilepsy  [facility]

## 2023-04-25 ENCOUNTER — TELEPHONE (OUTPATIENT)
Dept: NEUROLOGY | Facility: CLINIC | Age: 43
End: 2023-04-25
Payer: MEDICAID

## 2023-04-25 NOTE — TELEPHONE ENCOUNTER
----- Message from Qing Campuzano sent at 4/25/2023 12:43 PM CDT -----  Regarding: Advice  Contact: [phone]  Patient is calling to speak with someone in office for CPAP machine haven't been able to get one and need to see how can he get it. Please contact pt

## 2023-04-25 NOTE — TELEPHONE ENCOUNTER
Called pt to advise that he would have to contact his PCP for an order for the CPAP machine but that we could refer him to the sleep clinic also if he wanted to do that. ASH

## 2023-06-14 DIAGNOSIS — G40.209 COMPLEX PARTIAL SEIZURES EVOLVING TO GENERALIZED TONIC-CLONIC SEIZURES: ICD-10-CM

## 2023-06-14 NOTE — TELEPHONE ENCOUNTER
BRIVIACT 100 mg Tab     Sig: TAKE 1 TABLET BY MOUTH TWICE A DAY    Disp:  60 tablet    Refills:  Not specified    Start: 6/14/2023    Class: Normal    For: Complex partial seizures evolving to generalized tonic-clonic seizures    To pharmacy: This request is for a new prescription for a controlled substance as required by Federal/State law.    Last ordered: 6 months ago by Jessica Sheldon PA-C Last refill: 5/15/2023    Rx #: [id number]       To be filled at: [facility]/pharmacy #[id number] [address]

## 2023-08-28 ENCOUNTER — PATIENT MESSAGE (OUTPATIENT)
Dept: NEUROLOGY | Facility: CLINIC | Age: 43
End: 2023-08-28
Payer: MEDICAID

## 2023-09-01 ENCOUNTER — OFFICE VISIT (OUTPATIENT)
Dept: NEUROLOGY | Facility: CLINIC | Age: 43
End: 2023-09-01
Payer: MEDICAID

## 2023-09-01 DIAGNOSIS — G40.209 COMPLEX PARTIAL SEIZURES EVOLVING TO GENERALIZED TONIC-CLONIC SEIZURES: Primary | ICD-10-CM

## 2023-09-01 DIAGNOSIS — K08.9 DENTAL DISEASE: ICD-10-CM

## 2023-09-01 DIAGNOSIS — F39 MOOD DISORDER: ICD-10-CM

## 2023-09-01 PROCEDURE — 99214 OFFICE O/P EST MOD 30 MIN: CPT | Mod: 95,,,

## 2023-09-01 PROCEDURE — 99214 PR OFFICE/OUTPT VISIT, EST, LEVL IV, 30-39 MIN: ICD-10-PCS | Mod: 95,,,

## 2023-09-01 RX ORDER — CLOBAZAM 10 MG/1
10 TABLET ORAL NIGHTLY
Qty: 30 EACH | Refills: 5 | Status: SHIPPED | OUTPATIENT
Start: 2023-09-01 | End: 2024-02-28
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
## 2023-09-04 NOTE — PATIENT INSTRUCTIONS
You came to Epilepsy Clinic because of your seizure disorder.  Your seizures are well controlled on brivaracetam 100mg twice daily, lamotrigine 300mg twice daily, and clobazam 10mg nightly.  Please continue the same medications at the same dose.     Do not miss any doses of medication. If a dose of medication is missed, take it as soon as it is remembered even if that means doubling up on the dose. Get regular sleep. Go to sleep at the same time and wake up at the same time every day. People with epilepsy require more sleep than people without epilepsy.  Sleeping 10-12 hours a day can be normal for a person with epilepsy.  Every seizure makes it harder to prevent the next seizure. Epilepsy is associated with SUDEP, or sudden unexpected death in epilepsy.  The risk is significantly higher if convulsive seizures are not well controlled. For more information, check out these websites: https://www.epilepsy.com/, https://www.epilepsyalliance[place].org/, www.romeo-epilepsy.org, www.womenandepilepsy.org.  If you are interested in meeting other individuals in our epilepsy community, please reach out to the [facility] [place] ([phone], [phone], info@epilepsyl[place].org).  They organize many informative and fun activities in the region.  They can provide you invaluable information on how to get access to resources available for patients living with epilepsy as well as a rich community of like-minded individuals who are all learning to cope with the same issues.  It is very important to remember, you are not alone.     Continue taking sertraline 50mg (1 tablet) daily.     Per [place] law, no episodes of loss of consciousness for 6 months before driving.  Avoid dangerous situations.  For example, no baths/pools alone, no heights, no power tools.  Wear a bike helmet.  If breakthrough seizures occur that are different in character, frequency, or duration from normal episodes, please patient portal me or call  the office and we will decide the next steps. If multiple seizures occur in a row without return back to baseline, 911 needs to be called.     Return to clinic in 6 months or sooner with issues.  Please patient portal with any questions or concerns.    Jessica Sheldon PA-C   Neurology-Epilepsy  [facility]

## 2023-09-15 ENCOUNTER — PATIENT MESSAGE (OUTPATIENT)
Dept: NEUROLOGY | Facility: CLINIC | Age: 43
End: 2023-09-15
Payer: MEDICAID

## 2023-09-20 ENCOUNTER — PATIENT MESSAGE (OUTPATIENT)
Dept: NEUROLOGY | Facility: CLINIC | Age: 43
End: 2023-09-20
Payer: MEDICAID

## 2023-09-21 ENCOUNTER — SOCIAL WORK (OUTPATIENT)
Dept: NEUROLOGY | Facility: CLINIC | Age: 43
End: 2023-09-21
Payer: MEDICAID

## 2023-09-21 ENCOUNTER — PATIENT MESSAGE (OUTPATIENT)
Dept: NEUROLOGY | Facility: CLINIC | Age: 43
End: 2023-09-21
Payer: MEDICAID

## 2023-09-21 NOTE — PROGRESS NOTES
LCSW responded to patient and wife via portal message.   Attempted call but voicemail but phone does not have voicemail set up.     ===View-only below this line===      ----- Message -----       From:Young Joyner       Sent:9/20/2023  7:07 PM CDT         To:Jessica Sheldon PA-C    Subject:Not receiving proper medical att    Good evening im reaching out in regards to Young Joyner some things took place and as of now he is being held at [address]. [phone] ( medical) im totally embarrassed to be discussing this but im in need of your help. The [facility] ([facility] [place]) and nurses have requested that he has his sleep machine and preferably him be moved to another facility. So im asking is there any way that something can be faxed to inform them the importance of him having his medication & machine. and this is also per the warden request for him to be moved . I really dont know any other options the facility told me to try and reach out to you.Fax #[phone]  Feel Free to contact:

## 2023-12-30 DIAGNOSIS — G40.209 COMPLEX PARTIAL SEIZURES EVOLVING TO GENERALIZED TONIC-CLONIC SEIZURES: ICD-10-CM

## 2023-12-30 RX ORDER — LAMOTRIGINE 150 MG/1
300 TABLET ORAL 2 TIMES DAILY
Qty: 360 TABLET | Refills: 3 | Status: SHIPPED | OUTPATIENT
Start: 2023-12-30 | End: 2024-12-24

## 2024-06-19 ENCOUNTER — PATIENT MESSAGE (OUTPATIENT)
Dept: NEUROLOGY | Facility: CLINIC | Age: 44
End: 2024-06-19
Payer: MEDICAID

## 2025-05-22 ENCOUNTER — PATIENT MESSAGE (OUTPATIENT)
Dept: NEUROLOGY | Facility: CLINIC | Age: 45
End: 2025-05-22
Payer: MEDICAID